# Patient Record
Sex: FEMALE | Race: BLACK OR AFRICAN AMERICAN | Employment: FULL TIME | URBAN - METROPOLITAN AREA
[De-identification: names, ages, dates, MRNs, and addresses within clinical notes are randomized per-mention and may not be internally consistent; named-entity substitution may affect disease eponyms.]

---

## 2017-01-30 ENCOUNTER — HOSPITAL ENCOUNTER (EMERGENCY)
Age: 32
Discharge: HOME | End: 2017-01-30
Attending: EMERGENCY MEDICINE
Payer: MEDICAID

## 2017-01-30 VITALS
WEIGHT: 172.6 LBS | OXYGEN SATURATION: 100 % | SYSTOLIC BLOOD PRESSURE: 126 MMHG | DIASTOLIC BLOOD PRESSURE: 82 MMHG | RESPIRATION RATE: 18 BRPM | BODY MASS INDEX: 27.74 KG/M2 | HEIGHT: 66 IN | HEART RATE: 90 BPM | TEMPERATURE: 98.5 F

## 2017-01-30 DIAGNOSIS — N39.0 URINARY TRACT INFECTION WITHOUT HEMATURIA, SITE UNSPECIFIED: Primary | ICD-10-CM

## 2017-01-30 LAB
ALBUMIN SERPL BCP-MCNC: 3.8 G/DL (ref 3.5–5)
ALBUMIN/GLOB SERPL: 0.8 {RATIO} (ref 1.1–2.2)
ALP SERPL-CCNC: 90 U/L (ref 45–117)
ALT SERPL-CCNC: 15 U/L (ref 12–78)
ANION GAP BLD CALC-SCNC: 8 MMOL/L (ref 5–15)
APPEARANCE UR: ABNORMAL
AST SERPL W P-5'-P-CCNC: 17 U/L (ref 15–37)
BACTERIA URNS QL MICRO: ABNORMAL /HPF
BASOPHILS # BLD AUTO: 0 K/UL (ref 0–0.1)
BASOPHILS # BLD: 0 % (ref 0–1)
BILIRUB SERPL-MCNC: 0.3 MG/DL (ref 0.2–1)
BILIRUB UR QL: NEGATIVE
BUN SERPL-MCNC: 9 MG/DL (ref 6–20)
BUN/CREAT SERPL: 12 (ref 12–20)
CALCIUM SERPL-MCNC: 8.8 MG/DL (ref 8.5–10.1)
CHLORIDE SERPL-SCNC: 104 MMOL/L (ref 97–108)
CO2 SERPL-SCNC: 28 MMOL/L (ref 21–32)
COLOR UR: ABNORMAL
CREAT SERPL-MCNC: 0.77 MG/DL (ref 0.55–1.02)
EOSINOPHIL # BLD: 0.1 K/UL (ref 0–0.4)
EOSINOPHIL NFR BLD: 2 % (ref 0–7)
EPITH CASTS URNS QL MICRO: ABNORMAL /LPF
ERYTHROCYTE [DISTWIDTH] IN BLOOD BY AUTOMATED COUNT: 13.5 % (ref 11.5–14.5)
GLOBULIN SER CALC-MCNC: 4.7 G/DL (ref 2–4)
GLUCOSE SERPL-MCNC: 98 MG/DL (ref 65–100)
GLUCOSE UR STRIP.AUTO-MCNC: NEGATIVE MG/DL
HCG UR QL: NEGATIVE
HCT VFR BLD AUTO: 39.6 % (ref 35–47)
HGB BLD-MCNC: 13.2 G/DL (ref 11.5–16)
HGB UR QL STRIP: NEGATIVE
HYALINE CASTS URNS QL MICRO: ABNORMAL /LPF (ref 0–5)
KETONES UR QL STRIP.AUTO: ABNORMAL MG/DL
LEUKOCYTE ESTERASE UR QL STRIP.AUTO: ABNORMAL
LYMPHOCYTES # BLD AUTO: 37 % (ref 12–49)
LYMPHOCYTES # BLD: 2 K/UL (ref 0.8–3.5)
MCH RBC QN AUTO: 30.2 PG (ref 26–34)
MCHC RBC AUTO-ENTMCNC: 33.3 G/DL (ref 30–36.5)
MCV RBC AUTO: 90.6 FL (ref 80–99)
MONOCYTES # BLD: 0.4 K/UL (ref 0–1)
MONOCYTES NFR BLD AUTO: 8 % (ref 5–13)
MUCOUS THREADS URNS QL MICRO: ABNORMAL /LPF
NEUTS SEG # BLD: 2.9 K/UL (ref 1.8–8)
NEUTS SEG NFR BLD AUTO: 53 % (ref 32–75)
NITRITE UR QL STRIP.AUTO: POSITIVE
PH UR STRIP: 6 [PH] (ref 5–8)
PLATELET # BLD AUTO: 236 K/UL (ref 150–400)
POTASSIUM SERPL-SCNC: 3.9 MMOL/L (ref 3.5–5.1)
PROT SERPL-MCNC: 8.5 G/DL (ref 6.4–8.2)
PROT UR STRIP-MCNC: ABNORMAL MG/DL
RBC # BLD AUTO: 4.37 M/UL (ref 3.8–5.2)
RBC #/AREA URNS HPF: ABNORMAL /HPF (ref 0–5)
SODIUM SERPL-SCNC: 140 MMOL/L (ref 136–145)
SP GR UR REFRACTOMETRY: >1.03 (ref 1–1.03)
UA: UC IF INDICATED,UAUC: ABNORMAL
UROBILINOGEN UR QL STRIP.AUTO: 0.2 EU/DL (ref 0.2–1)
WBC # BLD AUTO: 5.5 K/UL (ref 3.6–11)
WBC URNS QL MICRO: ABNORMAL /HPF (ref 0–4)

## 2017-01-30 PROCEDURE — 36415 COLL VENOUS BLD VENIPUNCTURE: CPT | Performed by: EMERGENCY MEDICINE

## 2017-01-30 PROCEDURE — 87186 SC STD MICRODIL/AGAR DIL: CPT | Performed by: EMERGENCY MEDICINE

## 2017-01-30 PROCEDURE — 80053 COMPREHEN METABOLIC PANEL: CPT | Performed by: EMERGENCY MEDICINE

## 2017-01-30 PROCEDURE — 74011250637 HC RX REV CODE- 250/637: Performed by: PHYSICIAN ASSISTANT

## 2017-01-30 PROCEDURE — 87077 CULTURE AEROBIC IDENTIFY: CPT | Performed by: EMERGENCY MEDICINE

## 2017-01-30 PROCEDURE — 87086 URINE CULTURE/COLONY COUNT: CPT | Performed by: EMERGENCY MEDICINE

## 2017-01-30 PROCEDURE — 81001 URINALYSIS AUTO W/SCOPE: CPT | Performed by: EMERGENCY MEDICINE

## 2017-01-30 PROCEDURE — 99283 EMERGENCY DEPT VISIT LOW MDM: CPT

## 2017-01-30 PROCEDURE — 85025 COMPLETE CBC W/AUTO DIFF WBC: CPT | Performed by: EMERGENCY MEDICINE

## 2017-01-30 PROCEDURE — 81025 URINE PREGNANCY TEST: CPT

## 2017-01-30 RX ORDER — BUTALBITAL, ACETAMINOPHEN AND CAFFEINE 300; 40; 50 MG/1; MG/1; MG/1
1 CAPSULE ORAL
Qty: 10 CAP | Refills: 0 | Status: SHIPPED | OUTPATIENT
Start: 2017-01-30 | End: 2017-04-08

## 2017-01-30 RX ORDER — CEPHALEXIN 500 MG/1
500 CAPSULE ORAL 3 TIMES DAILY
Qty: 21 CAP | Refills: 0 | Status: SHIPPED | OUTPATIENT
Start: 2017-01-30 | End: 2017-02-06

## 2017-01-30 RX ORDER — CEPHALEXIN 500 MG/1
500 CAPSULE ORAL
Status: COMPLETED | OUTPATIENT
Start: 2017-01-30 | End: 2017-01-30

## 2017-01-30 RX ADMIN — CEPHALEXIN 500 MG: 500 CAPSULE ORAL at 18:50

## 2017-01-30 NOTE — ED TRIAGE NOTES
Pt arrives with urinary pain, foul smelling urine, headaches & chills for the last couple of days. Was admitted in July 2016 for UTI/kidney infection. Afebrile in triage.

## 2017-01-31 NOTE — ED PROVIDER NOTES
HPI Comments: 81-year-old female history of pyelonephritis presents with 7 days of dysuria urgency and frequency as well as foul smelling urine. No nausea vomiting flank pain or back pain. She has a mild headache as well she says last time she had these symptoms in association she was admitted for pyelonephritis. She does have history of migraines in the past, she is acting in her symptoms she typically takes fioricet. No neck pain. Patient is a 28 y.o. female presenting with urinary pain and headaches. Urinary Pain    Associated symptoms include urgency. Headache           Past Medical History:   Diagnosis Date    Psychiatric disorder        Past Surgical History:   Procedure Laterality Date    Hx gyn       left ovarian cyst removal         Family History:   Problem Relation Age of Onset    Diabetes Maternal Grandmother     Hypertension Maternal Grandmother        Social History     Social History    Marital status:      Spouse name: N/A    Number of children: N/A    Years of education: N/A     Occupational History    Not on file. Social History Main Topics    Smoking status: Never Smoker    Smokeless tobacco: Not on file    Alcohol use No    Drug use: Not on file    Sexual activity: Yes     Partners: Male     Other Topics Concern    Not on file     Social History Narrative         ALLERGIES: Amoxicillin    Review of Systems   Genitourinary: Positive for dysuria and urgency. Neurological: Positive for headaches. All other systems reviewed and are negative. Vitals:    01/30/17 1715   BP: 126/82   Pulse: 90   Resp: 18   Temp: 98.5 °F (36.9 °C)   SpO2: 100%   Weight: 78.3 kg (172 lb 9.6 oz)   Height: 5' 6\" (1.676 m)            Physical Exam   Constitutional: She is oriented to person, place, and time. She appears well-developed and well-nourished. HENT:   Head: Normocephalic and atraumatic.    Mouth/Throat: Oropharynx is clear and moist.   Eyes: Conjunctivae and EOM are normal. Pupils are equal, round, and reactive to light. Cardiovascular: Normal rate, regular rhythm and normal heart sounds. Pulmonary/Chest: Effort normal and breath sounds normal.   Abdominal: Soft. Normal appearance. There is tenderness in the suprapubic area. Musculoskeletal: Normal range of motion. Neurological: She is alert and oriented to person, place, and time. Skin: Skin is warm and dry. Nursing note and vitals reviewed.        MDM  Number of Diagnoses or Management Options  Urinary tract infection without hematuria, site unspecified:   Diagnosis management comments: UTI - no concern pyelo - keflex    Re ha - responded to fioricet in the past, will provide rx     ED Course       Procedures

## 2017-01-31 NOTE — DISCHARGE INSTRUCTIONS
We hope that we have addressed all of your medical concerns. The examination and treatment you received in the Emergency Department were for an emergent problem and were not intended as complete care. It is important that you follow up with your healthcare provider(s) for ongoing care. If your symptoms worsen or do not improve as expected, and you are unable to reach your usual health care provider(s), you should return to the Emergency Department. Today's healthcare is undergoing tremendous change, and patient satisfaction surveys are one of the many tools to assess the quality of medical care. You may receive a survey from the [employer] organization regarding your experience in the Emergency Department. I hope that your experience has been completely positive, particularly the medical care that I provided. As such, please participate in the survey; anything less than excellent does not meet my expectations or intentions. [facility] [place] [facility] and 8 [facility] participate in nationally recognized quality of care measures. If your blood pressure is greater than 120/80, as reported below, we urge that you seek medical care to address the potential of high blood pressure, commonly known as hypertension. Hypertension can be hereditary or can be caused by certain medical conditions, pain, stress, or \"white coat syndrome. \"       Please make an appointment with your health care provider(s) for follow up of your Emergency Department visit. VITALS:   Patient Vitals for the past 8 hrs:   Temp Pulse Resp BP SpO2   01/30/17 1715 98.5 °F (36.9 °C) 90 18 126/82 100 %          Thank you for allowing us to provide you with medical care today. We realize that you have many choices for your emergency care needs. Please choose us in the future for any continued health care needs. Forest Cardozo  [address] [facility], [address] [place] [address] Pr[place] Rupesh. Office: [phone]            Recent Results (from the past 24 hour(s))   URINALYSIS W/ REFLEX CULTURE    Collection Time: 01/30/17  5:40 PM   Result Value Ref Range    Color YELLOW/STRAW      Appearance CLOUDY (A) CLEAR      Specific gravity >1.030 (H) 1.003 - 1.030    pH (UA) 6.0 5.0 - 8.0      Protein TRACE (A) NEG mg/dL    Glucose NEGATIVE  NEG mg/dL    Ketone TRACE (A) NEG mg/dL    Bilirubin NEGATIVE  NEG      Blood NEGATIVE  NEG      Urobilinogen 0.2 0.2 - 1.0 EU/dL    Nitrites POSITIVE (A) NEG      Leukocyte Esterase SMALL (A) NEG      WBC 20-50 0 - 4 /hpf    RBC 0-5 0 - 5 /hpf    Epithelial cells MANY (A) FEW /lpf    Bacteria 4+ (A) NEG /hpf    UA:UC IF INDICATED URINE CULTURE ORDERED (A) CNI      Mucus 4+ (A) NEG /lpf    Hyaline Cast 2-5 0 - 5 /lpf   CBC WITH AUTOMATED DIFF    Collection Time: 01/30/17  5:40 PM   Result Value Ref Range    WBC 5.5 3.6 - 11.0 K/uL    RBC 4.37 3.80 - 5.20 M/uL    HGB 13.2 11.5 - 16.0 g/dL    HCT 39.6 35.0 - 47.0 %    MCV 90.6 80.0 - 99.0 FL    MCH 30.2 26.0 - 34.0 PG    MCHC 33.3 30.0 - 36.5 g/dL    RDW 13.5 11.5 - 14.5 %    PLATELET 148 635 - 449 K/uL    NEUTROPHILS 53 32 - 75 %    LYMPHOCYTES 37 12 - 49 %    MONOCYTES 8 5 - 13 %    EOSINOPHILS 2 0 - 7 %    BASOPHILS 0 0 - 1 %    ABS. NEUTROPHILS 2.9 1.8 - 8.0 K/UL    ABS. LYMPHOCYTES 2.0 0.8 - 3.5 K/UL    ABS. MONOCYTES 0.4 0.0 - 1.0 K/UL    ABS. EOSINOPHILS 0.1 0.0 - 0.4 K/UL    ABS.  BASOPHILS 0.0 0.0 - 0.1 K/UL   METABOLIC PANEL, COMPREHENSIVE    Collection Time: 01/30/17  5:40 PM   Result Value Ref Range    Sodium 140 136 - 145 mmol/L    Potassium 3.9 3.5 - 5.1 mmol/L    Chloride 104 97 - 108 mmol/L    CO2 28 21 - 32 mmol/L    Anion gap 8 5 - 15 mmol/L    Glucose 98 65 - 100 mg/dL    BUN 9 6 - 20 MG/DL    Creatinine 0.77 0.55 - 1.02 MG/DL    BUN/Creatinine ratio 12 12 - 20      GFR est AA >60 >60 ml/min/1.73m2    GFR est non-AA >60 >60 ml/min/1.73m2    Calcium 8.8 8.5 - 10.1 MG/DL    Bilirubin, total 0.3 0.2 - 1.0 MG/DL    ALT 15 12 - 78 U/L    AST 17 15 - 37 U/L    Alk. phosphatase 90 45 - 117 U/L    Protein, total 8.5 (H) 6.4 - 8.2 g/dL    Albumin 3.8 3.5 - 5.0 g/dL    Globulin 4.7 (H) 2.0 - 4.0 g/dL    A-G Ratio 0.8 (L) 1.1 - 2.2     HCG URINE, QL. - POC    Collection Time: 01/30/17  5:47 PM   Result Value Ref Range    Pregnancy test,urine (POC) NEGATIVE  NEG         No results found. Urinary Tract Infection in Women: Care Instructions  Your Care Instructions    A urinary tract infection, or UTI, is a general term for an infection anywhere between the kidneys and the urethra (where urine comes out). Most UTIs are bladder infections. They often cause pain or burning when you urinate. UTIs are caused by bacteria and can be cured with antibiotics. Be sure to complete your treatment so that the infection goes away. Follow-up care is a key part of your treatment and safety. Be sure to make and go to all appointments, and call your doctor if you are having problems. It's also a good idea to know your test results and keep a list of the medicines you take. How can you care for yourself at home? · Take your antibiotics as directed. Do not stop taking them just because you feel better. You need to take the full course of antibiotics. · Drink extra water and other fluids for the next day or two. This may help wash out the bacteria that are causing the infection. (If you have kidney, heart, or liver disease and have to limit fluids, talk with your doctor before you increase your fluid intake.)  · Avoid drinks that are carbonated or have caffeine. They can irritate the bladder. · Urinate often. Try to empty your bladder each time. · To relieve pain, take a hot bath or lay a heating pad set on low over your lower belly or genital area. Never go to sleep with a heating pad in place. To prevent UTIs  · Drink plenty of water each day. This helps you urinate often, which clears bacteria from your system.  (If you have kidney, heart, or liver disease and have to limit fluids, talk with your doctor before you increase your fluid intake.)  · Consider adding cranberry juice to your diet. · Urinate when you need to. · Urinate right after you have sex. · Change sanitary pads often. · Avoid douches, bubble baths, feminine hygiene sprays, and other feminine hygiene products that have deodorants. · After going to the bathroom, wipe from front to back. When should you call for help? Call your doctor now or seek immediate medical care if:  · Symptoms such as fever, chills, nausea, or vomiting get worse or appear for the first time. · You have new pain in your back just below your rib cage. This is called flank pain. · There is new blood or pus in your urine. · You have any problems with your antibiotic medicine. Watch closely for changes in your health, and be sure to contact your doctor if:  · You are not getting better after taking an antibiotic for 2 days. · Your symptoms go away but then come back. Where can you learn more? Go to http://coleman-kalia.info/. Enter A763 in the search box to learn more about \"Urinary Tract Infection in Women: Care Instructions. \"  Current as of: August 12, 2016  Content Version: 11.1  © 0164-4684 [employer]. Care instructions adapted under license by [employer] (which disclaims liability or warranty for this information). If you have questions about a medical condition or this instruction, always ask your healthcare professional. [place] [address] any warranty or liability for your use of this information.

## 2017-02-01 LAB
BACTERIA SPEC CULT: NORMAL
CC UR VC: NORMAL
SERVICE CMNT-IMP: NORMAL

## 2017-04-08 ENCOUNTER — APPOINTMENT (OUTPATIENT)
Dept: ULTRASOUND IMAGING | Age: 32
End: 2017-04-08
Attending: EMERGENCY MEDICINE
Payer: MEDICAID

## 2017-04-08 ENCOUNTER — HOSPITAL ENCOUNTER (EMERGENCY)
Age: 32
Discharge: HOME | End: 2017-04-09
Attending: EMERGENCY MEDICINE | Admitting: EMERGENCY MEDICINE
Payer: MEDICAID

## 2017-04-08 ENCOUNTER — APPOINTMENT (OUTPATIENT)
Dept: CT IMAGING | Age: 32
End: 2017-04-08
Attending: EMERGENCY MEDICINE
Payer: MEDICAID

## 2017-04-08 DIAGNOSIS — R10.84 ABDOMINAL PAIN, GENERALIZED: Primary | ICD-10-CM

## 2017-04-08 DIAGNOSIS — N76.0 BV (BACTERIAL VAGINOSIS): ICD-10-CM

## 2017-04-08 DIAGNOSIS — B96.89 BV (BACTERIAL VAGINOSIS): ICD-10-CM

## 2017-04-08 DIAGNOSIS — K76.9 LESION OF LIVER: ICD-10-CM

## 2017-04-08 DIAGNOSIS — K59.01 SLOW TRANSIT CONSTIPATION: ICD-10-CM

## 2017-04-08 DIAGNOSIS — N20.0 KIDNEY STONE ON LEFT SIDE: ICD-10-CM

## 2017-04-08 DIAGNOSIS — E86.0 DEHYDRATION: ICD-10-CM

## 2017-04-08 LAB
ALBUMIN SERPL BCP-MCNC: 4 G/DL (ref 3.5–5)
ALBUMIN/GLOB SERPL: 0.9 {RATIO} (ref 1.1–2.2)
ALP SERPL-CCNC: 87 U/L (ref 45–117)
ALT SERPL-CCNC: 16 U/L (ref 12–78)
ANION GAP BLD CALC-SCNC: 10 MMOL/L (ref 5–15)
APPEARANCE UR: ABNORMAL
AST SERPL W P-5'-P-CCNC: 15 U/L (ref 15–37)
BACTERIA URNS QL MICRO: NEGATIVE /HPF
BASOPHILS # BLD AUTO: 0 K/UL (ref 0–0.1)
BASOPHILS # BLD: 0 % (ref 0–1)
BILIRUB SERPL-MCNC: 0.3 MG/DL (ref 0.2–1)
BILIRUB UR QL: NEGATIVE
BUN SERPL-MCNC: 11 MG/DL (ref 6–20)
BUN/CREAT SERPL: 13 (ref 12–20)
CALCIUM SERPL-MCNC: 9.1 MG/DL (ref 8.5–10.1)
CHLORIDE SERPL-SCNC: 104 MMOL/L (ref 97–108)
CLUE CELLS VAG QL WET PREP: NORMAL
CO2 SERPL-SCNC: 27 MMOL/L (ref 21–32)
COLOR UR: ABNORMAL
CREAT SERPL-MCNC: 0.83 MG/DL (ref 0.55–1.02)
EOSINOPHIL # BLD: 0.1 K/UL (ref 0–0.4)
EOSINOPHIL NFR BLD: 1 % (ref 0–7)
EPITH CASTS URNS QL MICRO: ABNORMAL /LPF
ERYTHROCYTE [DISTWIDTH] IN BLOOD BY AUTOMATED COUNT: 13 % (ref 11.5–14.5)
GLOBULIN SER CALC-MCNC: 4.3 G/DL (ref 2–4)
GLUCOSE SERPL-MCNC: 84 MG/DL (ref 65–100)
GLUCOSE UR STRIP.AUTO-MCNC: NEGATIVE MG/DL
HCG UR QL: NEGATIVE
HCT VFR BLD AUTO: 39.9 % (ref 35–47)
HGB BLD-MCNC: 14.1 G/DL (ref 11.5–16)
HGB UR QL STRIP: NEGATIVE
KETONES UR QL STRIP.AUTO: NEGATIVE MG/DL
KOH PREP SPEC: NORMAL
LEUKOCYTE ESTERASE UR QL STRIP.AUTO: ABNORMAL
LIPASE SERPL-CCNC: 119 U/L (ref 73–393)
LYMPHOCYTES # BLD AUTO: 48 % (ref 12–49)
LYMPHOCYTES # BLD: 2.4 K/UL (ref 0.8–3.5)
MCH RBC QN AUTO: 31.6 PG (ref 26–34)
MCHC RBC AUTO-ENTMCNC: 35.3 G/DL (ref 30–36.5)
MCV RBC AUTO: 89.5 FL (ref 80–99)
MONOCYTES # BLD: 0.3 K/UL (ref 0–1)
MONOCYTES NFR BLD AUTO: 7 % (ref 5–13)
MUCOUS THREADS URNS QL MICRO: ABNORMAL /LPF
NEUTS SEG # BLD: 2.2 K/UL (ref 1.8–8)
NEUTS SEG NFR BLD AUTO: 44 % (ref 32–75)
NITRITE UR QL STRIP.AUTO: NEGATIVE
PH UR STRIP: 5.5 [PH] (ref 5–8)
PLATELET # BLD AUTO: 244 K/UL (ref 150–400)
POTASSIUM SERPL-SCNC: 3.7 MMOL/L (ref 3.5–5.1)
PROT SERPL-MCNC: 8.3 G/DL (ref 6.4–8.2)
PROT UR STRIP-MCNC: ABNORMAL MG/DL
RBC # BLD AUTO: 4.46 M/UL (ref 3.8–5.2)
RBC #/AREA URNS HPF: ABNORMAL /HPF (ref 0–5)
SERVICE CMNT-IMP: NORMAL
SODIUM SERPL-SCNC: 141 MMOL/L (ref 136–145)
SP GR UR REFRACTOMETRY: >1.03 (ref 1–1.03)
T VAGINALIS VAG QL WET PREP: NORMAL
UA: UC IF INDICATED,UAUC: ABNORMAL
UROBILINOGEN UR QL STRIP.AUTO: 1 EU/DL (ref 0.2–1)
WBC # BLD AUTO: 5.1 K/UL (ref 3.6–11)
WBC URNS QL MICRO: ABNORMAL /HPF (ref 0–4)

## 2017-04-08 PROCEDURE — 36415 COLL VENOUS BLD VENIPUNCTURE: CPT | Performed by: EMERGENCY MEDICINE

## 2017-04-08 PROCEDURE — 83690 ASSAY OF LIPASE: CPT | Performed by: EMERGENCY MEDICINE

## 2017-04-08 PROCEDURE — 87491 CHLMYD TRACH DNA AMP PROBE: CPT | Performed by: EMERGENCY MEDICINE

## 2017-04-08 PROCEDURE — 81001 URINALYSIS AUTO W/SCOPE: CPT | Performed by: EMERGENCY MEDICINE

## 2017-04-08 PROCEDURE — 85025 COMPLETE CBC W/AUTO DIFF WBC: CPT | Performed by: EMERGENCY MEDICINE

## 2017-04-08 PROCEDURE — 99284 EMERGENCY DEPT VISIT MOD MDM: CPT

## 2017-04-08 PROCEDURE — 74177 CT ABD & PELVIS W/CONTRAST: CPT

## 2017-04-08 PROCEDURE — 80053 COMPREHEN METABOLIC PANEL: CPT | Performed by: EMERGENCY MEDICINE

## 2017-04-08 PROCEDURE — 74011636320 HC RX REV CODE- 636/320: Performed by: EMERGENCY MEDICINE

## 2017-04-08 PROCEDURE — 81025 URINE PREGNANCY TEST: CPT | Performed by: EMERGENCY MEDICINE

## 2017-04-08 PROCEDURE — 74011250636 HC RX REV CODE- 250/636: Performed by: EMERGENCY MEDICINE

## 2017-04-08 PROCEDURE — 96374 THER/PROPH/DIAG INJ IV PUSH: CPT

## 2017-04-08 PROCEDURE — 76856 US EXAM PELVIC COMPLETE: CPT

## 2017-04-08 PROCEDURE — 96361 HYDRATE IV INFUSION ADD-ON: CPT

## 2017-04-08 PROCEDURE — 87210 SMEAR WET MOUNT SALINE/INK: CPT | Performed by: EMERGENCY MEDICINE

## 2017-04-08 PROCEDURE — 76830 TRANSVAGINAL US NON-OB: CPT

## 2017-04-08 RX ORDER — ONDANSETRON 4 MG/1
4 TABLET, FILM COATED ORAL
Qty: 20 TAB | Refills: 0 | Status: SHIPPED | OUTPATIENT
Start: 2017-04-08 | End: 2021-05-18 | Stop reason: CLARIF

## 2017-04-08 RX ORDER — HYDROCODONE BITARTRATE AND ACETAMINOPHEN 5; 325 MG/1; MG/1
1 TABLET ORAL
Qty: 20 TAB | Refills: 0 | Status: SHIPPED | OUTPATIENT
Start: 2017-04-08 | End: 2017-07-27

## 2017-04-08 RX ORDER — SODIUM CHLORIDE 0.9 % (FLUSH) 0.9 %
10 SYRINGE (ML) INJECTION
Status: COMPLETED | OUTPATIENT
Start: 2017-04-08 | End: 2017-04-09

## 2017-04-08 RX ORDER — KETOROLAC TROMETHAMINE 30 MG/ML
30 INJECTION, SOLUTION INTRAMUSCULAR; INTRAVENOUS
Status: COMPLETED | OUTPATIENT
Start: 2017-04-08 | End: 2017-04-08

## 2017-04-08 RX ORDER — METRONIDAZOLE 500 MG/1
500 TABLET ORAL 2 TIMES DAILY
Qty: 14 TAB | Refills: 0 | Status: SHIPPED | OUTPATIENT
Start: 2017-04-08 | End: 2017-04-15

## 2017-04-08 RX ORDER — ONDANSETRON 2 MG/ML
4 INJECTION INTRAMUSCULAR; INTRAVENOUS
Status: DISCONTINUED | OUTPATIENT
Start: 2017-04-08 | End: 2017-04-08

## 2017-04-08 RX ORDER — SODIUM CHLORIDE 9 MG/ML
50 INJECTION, SOLUTION INTRAVENOUS
Status: COMPLETED | OUTPATIENT
Start: 2017-04-08 | End: 2017-04-09

## 2017-04-08 RX ORDER — MORPHINE SULFATE 4 MG/ML
4 INJECTION, SOLUTION INTRAMUSCULAR; INTRAVENOUS
Status: DISCONTINUED | OUTPATIENT
Start: 2017-04-08 | End: 2017-04-08

## 2017-04-08 RX ADMIN — KETOROLAC TROMETHAMINE 30 MG: 30 INJECTION, SOLUTION INTRAMUSCULAR at 23:27

## 2017-04-08 RX ADMIN — SODIUM CHLORIDE 1000 ML: 900 INJECTION, SOLUTION INTRAVENOUS at 22:34

## 2017-04-08 RX ADMIN — SODIUM CHLORIDE 1000 ML: 900 INJECTION, SOLUTION INTRAVENOUS at 20:55

## 2017-04-08 RX ADMIN — DIATRIZOATE MEGLUMINE AND DIATRIZOATE SODIUM 30 ML: 660; 100 LIQUID ORAL; RECTAL at 23:27

## 2017-04-09 VITALS
RESPIRATION RATE: 16 BRPM | SYSTOLIC BLOOD PRESSURE: 139 MMHG | WEIGHT: 167.99 LBS | DIASTOLIC BLOOD PRESSURE: 66 MMHG | HEART RATE: 80 BPM | BODY MASS INDEX: 27.11 KG/M2 | TEMPERATURE: 98.5 F | OXYGEN SATURATION: 99 %

## 2017-04-09 PROCEDURE — 74011636320 HC RX REV CODE- 636/320: Performed by: EMERGENCY MEDICINE

## 2017-04-09 PROCEDURE — 74011250636 HC RX REV CODE- 250/636: Performed by: EMERGENCY MEDICINE

## 2017-04-09 RX ADMIN — IOPAMIDOL 100 ML: 755 INJECTION, SOLUTION INTRAVENOUS at 00:48

## 2017-04-09 RX ADMIN — Medication 10 ML: at 00:48

## 2017-04-09 RX ADMIN — SODIUM CHLORIDE 50 ML/HR: 900 INJECTION, SOLUTION INTRAVENOUS at 00:48

## 2017-04-09 NOTE — ED PROVIDER NOTES
HPI Comments: Stephani Redding is a 28 y.o. female with significant PMHx of ovarian cysts, presents ambulatory to the ED with c/o acute onset of sharp lower abdominal pain x last night. She states she took tylenol with relief last night, however the pain returned. She notes that today a sharp pain \"hit me and I hit the floor\" and the pain felt like \"contractions\". Pt reports she had her menstrual cycle for only 2 days last month, which is not normal for her. Pt notes a family history of diabetes and cardiac problems. Pt denies nausea, vomiting, fever, chills, dysuria, diarrhea, constipation, vaginal discharge, chest pain, SOB, tobacco use, or alcohol use. PCP: Anna Forrester MD    There are no other complaints, changes or physical findings at this time. Written by NATE Luo, as dictated by Bindu Go MD      The history is provided by the patient. No  was used. Past Medical History:   Diagnosis Date    Psychiatric disorder        Past Surgical History:   Procedure Laterality Date    HX GYN      left ovarian cyst removal         Family History:   Problem Relation Age of Onset    Diabetes Maternal Grandmother     Hypertension Maternal Grandmother        Social History     Social History    Marital status:      Spouse name: N/A    Number of children: N/A    Years of education: N/A     Occupational History    Not on file. Social History Main Topics    Smoking status: Never Smoker    Smokeless tobacco: Not on file    Alcohol use No    Drug use: No    Sexual activity: Yes     Partners: Male     Other Topics Concern    Not on file     Social History Narrative         ALLERGIES: Amoxicillin    Review of Systems   Constitutional: Negative for chills and fever. HENT: Negative for congestion. Eyes: Negative. Respiratory: Negative for shortness of breath. Cardiovascular: Negative for chest pain.    Gastrointestinal: Positive for abdominal pain (lower). Negative for constipation, diarrhea, nausea and vomiting. Endocrine: Negative for heat intolerance. Genitourinary: Negative for dysuria and vaginal discharge. Musculoskeletal: Negative for back pain. Skin: Negative for rash. Allergic/Immunologic: Negative for immunocompromised state. Neurological: Negative for dizziness. Hematological: Does not bruise/bleed easily. Psychiatric/Behavioral: Negative. All other systems reviewed and are negative. Vitals:    04/08/17 1955 04/08/17 2030   BP: 125/76 112/68   Pulse: 80    Resp: 16    Temp: 98.5 °F (36.9 °C)    SpO2: 98% 100%   Weight: 76.2 kg (167 lb 15.9 oz)             Physical Exam   Constitutional: She is oriented to person, place, and time. She appears well-developed and well-nourished. No distress. HENT:   Head: Normocephalic and atraumatic. Eyes: EOM are normal.   Neck: Normal range of motion. Neck supple. Cardiovascular: Normal rate, regular rhythm and normal heart sounds. Pulmonary/Chest: Effort normal and breath sounds normal. No respiratory distress. She has no wheezes. Abdominal: Soft. Bowel sounds are normal. She exhibits no mass. There is tenderness (diffuse). Genitourinary: Vagina normal. There is no rash, tenderness or lesion on the right labia. There is no rash, tenderness or lesion on the left labia. Uterus is tender. Cervix exhibits no motion tenderness and no discharge. Right adnexum displays no mass, no tenderness and no fullness. Left adnexum displays no mass, no tenderness and no fullness. No bleeding in the vagina. No vaginal discharge found. Musculoskeletal: Normal range of motion. She exhibits no edema. Lymphadenopathy:        Right: No inguinal adenopathy present. Left: No inguinal adenopathy present. Neurological: She is alert and oriented to person, place, and time. Coordination normal.   Skin: Skin is warm and dry. Psychiatric: She has a normal mood and affect. Nursing note and vitals reviewed. MDM  Number of Diagnoses or Management Options  Abdominal pain, generalized:   BV (bacterial vaginosis):   Dehydration:   Kidney stone on left side:   Lesion of liver:   Slow transit constipation:   Diagnosis management comments:       DDx: ovarian cyst, kidney stone, UTI, PID, torsion, pancreatitis        Amount and/or Complexity of Data Reviewed  Clinical lab tests: ordered and reviewed  Tests in the radiology section of CPT®: ordered and reviewed  Tests in the medicine section of CPT®: reviewed and ordered    Patient Progress  Patient progress: stable    ED Course       Procedures    Procedure Note - Pelvic Exam:    9:21 PM  Performed by: Darek Wells   Chaperoned by: Hoda Maurice RN  Pelvic exam was performed using bimanual and speculum. Further findings noted in physical exam.   The procedure took 1-15 minutes, and pt tolerated well. Written by Isa Pineda, NATE Scribe, as dictated by Betsy Ron MD.    Progress Note:  11:11 PM  Pt report she is not feeling better, however does not want strong pain medication because she drove herself to the ER. Ultrasound was negative and will CT pt. Written by Isa Pineda ED Scribe, as dictated by Betsy Ron MD.    SIGN OUT:  11:22 PM  Patient's presentation, labs/imaging and plan of care was reviewed with Quyen Escamilla M.D. as part of sign out. They will wait for CT results and discharge if results are negative as part of the plan discussed with the patient. Quyen Escamilla M.D.'s assistance in completion of this plan is greatly appreciated but it should be noted that I will be the provider of record for this patient. Betsy Ron MD      This note is prepared by Isa Pineda, acting as Scribe for Betsy Ron MD.    Betsy Ron MD : The scribe's documentation has been prepared under my direction and personally reviewed by me in its entirety.  I confirm that the note above accurately reflects all work, treatment, procedures, and medical decision making performed by me. Progress Note:  2:05 AM  Discussed CT results with pt and advised that she will need to F/U for outpatient MRI. Written by Lucian Saldivar ED Scribe as dictated by Walker Gregory M.D    DISCHARGE NOTE:  LABORATORY TESTS:  Recent Results (from the past 12 hour(s))   URINALYSIS W/ REFLEX CULTURE    Collection Time: 04/08/17  8:00 PM   Result Value Ref Range    Color YELLOW/STRAW      Appearance CLOUDY (A) CLEAR      Specific gravity >1.030 (H) 1.003 - 1.030    pH (UA) 5.5 5.0 - 8.0      Protein TRACE (A) NEG mg/dL    Glucose NEGATIVE  NEG mg/dL    Ketone NEGATIVE  NEG mg/dL    Bilirubin NEGATIVE  NEG      Blood NEGATIVE  NEG      Urobilinogen 1.0 0.2 - 1.0 EU/dL    Nitrites NEGATIVE  NEG      Leukocyte Esterase TRACE (A) NEG      WBC 0-4 0 - 4 /hpf    RBC 0-5 0 - 5 /hpf    Epithelial cells MANY (A) FEW /lpf    Bacteria NEGATIVE  NEG /hpf    UA:UC IF INDICATED CULTURE NOT INDICATED BY UA RESULT CNI      Mucus 1+ (A) NEG /lpf   HCG URINE, QL    Collection Time: 04/08/17  8:00 PM   Result Value Ref Range    HCG urine, Ql. NEGATIVE  NEG     CBC WITH AUTOMATED DIFF    Collection Time: 04/08/17  8:33 PM   Result Value Ref Range    WBC 5.1 3.6 - 11.0 K/uL    RBC 4.46 3.80 - 5.20 M/uL    HGB 14.1 11.5 - 16.0 g/dL    HCT 39.9 35.0 - 47.0 %    MCV 89.5 80.0 - 99.0 FL    MCH 31.6 26.0 - 34.0 PG    MCHC 35.3 30.0 - 36.5 g/dL    RDW 13.0 11.5 - 14.5 %    PLATELET 616 672 - 016 K/uL    NEUTROPHILS 44 32 - 75 %    LYMPHOCYTES 48 12 - 49 %    MONOCYTES 7 5 - 13 %    EOSINOPHILS 1 0 - 7 %    BASOPHILS 0 0 - 1 %    ABS. NEUTROPHILS 2.2 1.8 - 8.0 K/UL    ABS. LYMPHOCYTES 2.4 0.8 - 3.5 K/UL    ABS. MONOCYTES 0.3 0.0 - 1.0 K/UL    ABS. EOSINOPHILS 0.1 0.0 - 0.4 K/UL    ABS.  BASOPHILS 0.0 0.0 - 0.1 K/UL   METABOLIC PANEL, COMPREHENSIVE    Collection Time: 04/08/17  8:33 PM   Result Value Ref Range    Sodium 141 136 - 145 mmol/L    Potassium 3.7 3.5 - 5.1 mmol/L    Chloride 104 97 - 108 mmol/L    CO2 27 21 - 32 mmol/L    Anion gap 10 5 - 15 mmol/L    Glucose 84 65 - 100 mg/dL    BUN 11 6 - 20 MG/DL    Creatinine 0.83 0.55 - 1.02 MG/DL    BUN/Creatinine ratio 13 12 - 20      GFR est AA >60 >60 ml/min/1.73m2    GFR est non-AA >60 >60 ml/min/1.73m2    Calcium 9.1 8.5 - 10.1 MG/DL    Bilirubin, total 0.3 0.2 - 1.0 MG/DL    ALT (SGPT) 16 12 - 78 U/L    AST (SGOT) 15 15 - 37 U/L    Alk. phosphatase 87 45 - 117 U/L    Protein, total 8.3 (H) 6.4 - 8.2 g/dL    Albumin 4.0 3.5 - 5.0 g/dL    Globulin 4.3 (H) 2.0 - 4.0 g/dL    A-G Ratio 0.9 (L) 1.1 - 2.2     LIPASE    Collection Time: 04/08/17  8:33 PM   Result Value Ref Range    Lipase 119 73 - 393 U/L   WET PREP    Collection Time: 04/08/17  9:26 PM   Result Value Ref Range    Clue cells CLUE CELLS PRESENT      Wet prep NO TRICHOMONAS SEEN     KOH, OTHER SOURCES    Collection Time: 04/08/17  9:26 PM   Result Value Ref Range    Special Requests: NO SPECIAL REQUESTS      KOH NO YEAST SEEN         IMAGING RESULTS:  CT ABD PELV W CONT   Final ResultINDICATION: Middle lower abdominal pain. Normal white blood cell count. Normal  liver enzymes and lipase.     COMPARISON: Ultrasound female pelvis on 4/8/2017.     TECHNIQUE:   Following the uneventful intravenous administration of 100 cc Isovue-370, thin  axial images were obtained through the abdomen and pelvis. Coronal and sagittal  reconstructions were generated. Oral contrast was administered. CT dose  reduction was achieved through use of a standardized protocol tailored for this  examination and automatic exposure control for dose modulation.     FINDINGS:   LUNG BASES: Clear. INCIDENTALLY IMAGED HEART AND MEDIASTINUM: Unremarkable. LIVER: There are numerous enhancing liver lesions, similar to blood pool. Liver  surface is smooth. GALLBLADDER: Unremarkable. SPLEEN: No mass. PANCREAS: No mass or ductal dilatation. ADRENALS: Unremarkable.   KIDNEYS: 1 mm calculus is in the upper pole of the left kidney. No solid renal  mass or hydronephrosis. STOMACH: Distended with enteric contrast.  SMALL BOWEL: No dilatation or wall thickening. COLON: Mild colonic fecal stasis. APPENDIX: Unremarkable. PERITONEUM: No ascites or pneumoperitoneum. RETROPERITONEUM: No lymphadenopathy or aortic aneurysm. REPRODUCTIVE ORGANS: Uterus and ovaries are within normal limits. Free pelvic  fluid is physiologic. URINARY BLADDER: No mass or calculus. BONES: No destructive bone lesion. ADDITIONAL COMMENTS: N/A     IMPRESSION  IMPRESSION:     1. Normal appendix. 2. Mild colonic fecal stasis. 3. Nonobstructing left nephrolithiasis. 4. Nonspecific liver lesions likely represent hemangiomas or adenomas. Recommend  nonemergent dedicated hepatic MRI.         US PELV NON OBS   Final ResultEXAM: US PELV NON OBS, US TRANSVAGINAL     INDICATION: Right ovarian cyst in June, 2016. Midline pelvic pain for 3 days.     COMPARISON: None.     TECHNIQUE: Limited transabdominal and complete endovaginal ultrasound of the  female pelvis. (2 separate studies reported together) Transvaginal scanning was  performed for better evaluation of all structures.     FINDINGS: Transabdominal ultrasound:     Urinary bladder: Nondistended. Uterus and ovaries are not visible. No evidence  of adnexal mass.     Endovaginal ultrasound:      Urinary bladder: Nondistended.     Uterus: measures 8.8 x 5.1 x 5.0 cm, which is within normal limits. There is no  sonographic myometrial abnormality.     Endometrium: 14 mm in thickness. Homogeneous.     Right ovary: 3.4 x 3.2 x 1.9 cm. Blood flow is present in the right ovary. No  right adnexal mass or cyst.     Left ovary: Obscured by bowel gas. No adnexal cyst or mass.     Free fluid: Physiologic.     IMPRESSION  IMPRESSION:   Normal sonographic appearance of the female pelvis. Normal right ovary. No  visible left ovary.       US TRANSVAGINAL   Final ResultEXAM: US PELV NON OBS, US TRANSVAGINAL     INDICATION: Right ovarian cyst in June, 2016. Midline pelvic pain for 3 days.     COMPARISON: None.     TECHNIQUE: Limited transabdominal and complete endovaginal ultrasound of the  female pelvis. (2 separate studies reported together) Transvaginal scanning was  performed for better evaluation of all structures.     FINDINGS: Transabdominal ultrasound:     Urinary bladder: Nondistended. Uterus and ovaries are not visible. No evidence  of adnexal mass.     Endovaginal ultrasound:      Urinary bladder: Nondistended.     Uterus: measures 8.8 x 5.1 x 5.0 cm, which is within normal limits. There is no  sonographic myometrial abnormality.     Endometrium: 14 mm in thickness. Homogeneous.     Right ovary: 3.4 x 3.2 x 1.9 cm. Blood flow is present in the right ovary. No  right adnexal mass or cyst.     Left ovary: Obscured by bowel gas. No adnexal cyst or mass.     Free fluid: Physiologic.     IMPRESSION  IMPRESSION:   Normal sonographic appearance of the female pelvis. Normal right ovary. No  visible left ovary. MEDICATIONS GIVEN:  Medications   sodium chloride 0.9 % bolus infusion 1,000 mL (0 mL IntraVENous IV Completed 4/8/17 2234)   sodium chloride 0.9 % bolus infusion 1,000 mL (1,000 mL IntraVENous New Bag 4/8/17 2234)   ketorolac (TORADOL) injection 30 mg (30 mg IntraVENous Given 4/8/17 2327)   diatrizoate meglumine-d.sodium (MD-GASTROVIEW,GASTROGRAFIN) 66-10 % contrast solution 30 mL (30 mL Oral Given 4/8/17 2327)   0.9% sodium chloride infusion (50 mL/hr IntraVENous New Bag 4/9/17 0048)   sodium chloride (NS) flush 10 mL (10 mL IntraVENous Given 4/9/17 0048)   iopamidol (ISOVUE-370) 76 % injection 100 mL (100 mL IntraVENous Given 4/9/17 0048)       IMPRESSION:  1. Abdominal pain, generalized    2. Dehydration    3. BV (bacterial vaginosis)    4. Slow transit constipation    5. Lesion of liver    6.  Kidney stone on left side        PLAN:  Current Discharge Medication List      START taking these medications    Details   ondansetron hcl (ZOFRAN, AS HYDROCHLORIDE,) 4 mg tablet Take 1 Tab by mouth every eight (8) hours as needed for Nausea. Qty: 20 Tab, Refills: 0      HYDROcodone-acetaminophen (NORCO) 5-325 mg per tablet Take 1 Tab by mouth every four (4) hours as needed for Pain. Max Daily Amount: 6 Tabs. Qty: 20 Tab, Refills: 0      metroNIDAZOLE (FLAGYL) 500 mg tablet Take 1 Tab by mouth two (2) times a day for 7 days. Qty: 14 Tab, Refills: 0         STOP taking these medications       butalbital-acetaminophen-caff (FIORICET) -40 mg per capsule Comments:   Reason for Stopping:         oxyCODONE IR (ROXICODONE) 5 mg immediate release tablet Comments:   Reason for Stopping:         levofloxacin (LEVAQUIN) 500 mg tablet Comments:   Reason for Stopping: Follow-up Information     Follow up With Details Comments Contact Johanna Laws MD In 2 days As needed [address] [facility]  [address] [facility] [address]      Mary Ann Laureano MD  As needed [address]  [address] [address] [phone]      [facility] EMERGENCY DEPT  If symptoms worsen [address] [facility]  [address] [facility]  [phone]        Return to ED if worse       Discharge Note:  2:05 AM  The patient has been re-evaluated and is ready for discharge. Reviewed available results with patient. Counseled patient on diagnosis and care plan. Patient has expressed understanding, and all questions have been answered. Patient agrees with plan and agrees to follow up as recommended, or to return to the ED if their symptoms worsen. Discharge instructions have been provided and explained to the patient, along with reasons to return to the ED. Written by Usha Roberts, ED Scribe, as dictated by Malu Lr M.D. ATTESTATION:  This note is prepared by Usha Roberts, acting as Scribe for Malu Lr M.D. Malu Lr M.D and personally reviewed by me in its entirety.  I confirm that the note above accurately reflects all work, treatment, procedures, and medical decision making performed by me.

## 2017-04-09 NOTE — DISCHARGE INSTRUCTIONS
Please make sure to follow up with your primary care doctor about outpatient MRI to evaluate liver lesions. You were seen for decreased bowel movements and abdominal pain, most likely due to constipation. For the constipation, drink plenty of fluids, use Colace stool softener with Dulcolax or Miralax twice a day laxative until bowel movement occurs. If still no bowel movement, can buy Magnesium Citrate over the counter for a whole bowel cleanse. Maintain a high fiber diet by eating green vegetables and fruit and drink 6-8 large glasses of water daily to avoid constipation in the future. Metamucil, 1 tablespoon once or twice daily can be used to keep bowels regular if needed. Timing elimination to occur after meals, or after a hot drink, can also improve the situation long term. Call if symptoms persist or worsen. Constipation: Care Instructions  Your Care Instructions  Constipation means that you have a hard time passing stools (bowel movements). People pass stools from 3 times a day to once every 3 days. What is normal for you may be different. Constipation may occur with pain in the rectum and cramping. The pain may get worse when you try to pass stools. Sometimes there are small amounts of bright red blood on toilet paper or the surface of stools. This is because of enlarged veins near the rectum (hemorrhoids). A few changes in your diet and lifestyle may help you avoid ongoing constipation. Your doctor may also prescribe medicine to help loosen your stool. Some medicines can cause constipation. These include pain medicines and antidepressants. Tell your doctor about all the medicines you take. Your doctor may want to make a medicine change to ease your symptoms. Follow-up care is a key part of your treatment and safety. Be sure to make and go to all appointments, and call your doctor if you are having problems.  It's also a good idea to know your test results and keep a list of the medicines you take. How can you care for yourself at home? · Drink plenty of fluids, enough so that your urine is light yellow or clear like water. If you have kidney, heart, or liver disease and have to limit fluids, talk with your doctor before you increase the amount of fluids you drink. · Include high-fiber foods in your diet each day. These include fruits, vegetables, beans, and whole grains. · Get at least 30 minutes of exercise on most days of the week. Walking is a good choice. You also may want to do other activities, such as running, swimming, cycling, or playing tennis or team sports. · Take a fiber supplement, such as Citrucel or Metamucil, every day. Read and follow all instructions on the label. · Schedule time each day for a bowel movement. A daily routine may help. Take your time having your bowel movement. · Support your feet with a small step stool when you sit on the toilet. This helps flex your hips and places your pelvis in a squatting position. · Your doctor may recommend an over-the-counter laxative to relieve your constipation. Examples are Milk of Magnesia and MiraLax. Read and follow all instructions on the label. Do not use laxatives on a long-term basis. When should you call for help? Call your doctor now or seek immediate medical care if:  · You have new or worse belly pain. · You have new or worse nausea or vomiting. · You have blood in your stools. Watch closely for changes in your health, and be sure to contact your doctor if:  · Your constipation is getting worse. · You do not get better as expected. Where can you learn more? Go to http://coleman-kalia.info/. Enter 21  in the search box to learn more about \"Constipation: Care Instructions. \"  Current as of: May 27, 2016  Content Version: 11.2  © 0927-7934 [employer]. Care instructions adapted under license by [employer] (which disclaims liability or warranty for this information).  If you have questions about a medical condition or this instruction, always ask your healthcare professional. [address] any warranty or liability for your use of this information. Abdominal Pain: Care Instructions  Your Care Instructions    Abdominal pain has many possible causes. Some aren't serious and get better on their own in a few days. Others need more testing and treatment. If your pain continues or gets worse, you need to be rechecked and may need more tests to find out what is wrong. You may need surgery to correct the problem. Don't ignore new symptoms, such as fever, nausea and vomiting, urination problems, pain that gets worse, and dizziness. These may be signs of a more serious problem. Your doctor may have recommended a follow-up visit in the next 8 to 12 hours. If you are not getting better, you may need more tests or treatment. The doctor has checked you carefully, but problems can develop later. If you notice any problems or new symptoms, get medical treatment right away. Follow-up care is a key part of your treatment and safety. Be sure to make and go to all appointments, and call your doctor if you are having problems. It's also a good idea to know your test results and keep a list of the medicines you take. How can you care for yourself at home? · Rest until you feel better. · To prevent dehydration, drink plenty of fluids, enough so that your urine is light yellow or clear like water. Choose water and other caffeine-free clear liquids until you feel better. If you have kidney, heart, or liver disease and have to limit fluids, talk with your doctor before you increase the amount of fluids you drink. · If your stomach is upset, eat mild foods, such as rice, dry toast or crackers, bananas, and applesauce. Try eating several small meals instead of two or three large ones.   · Wait until 48 hours after all symptoms have gone away before you have spicy foods, alcohol, and drinks that contain caffeine. · Do not eat foods that are high in fat. · Avoid anti-inflammatory medicines such as aspirin, ibuprofen (Advil, Motrin), and naproxen (Aleve). These can cause stomach upset. Talk to your doctor if you take daily aspirin for another health problem. When should you call for help? Call 911 anytime you think you may need emergency care. For example, call if:  · You passed out (lost consciousness). · You pass maroon or very bloody stools. · You vomit blood or what looks like coffee grounds. · You have new, severe belly pain. Call your doctor now or seek immediate medical care if:  · Your pain gets worse, especially if it becomes focused in one area of your belly. · You have a new or higher fever. · Your stools are black and look like tar, or they have streaks of blood. · You have unexpected vaginal bleeding. · You have symptoms of a urinary tract infection. These may include:  ¨ Pain when you urinate. ¨ Urinating more often than usual.  ¨ Blood in your urine. · You are dizzy or lightheaded, or you feel like you may faint. Watch closely for changes in your health, and be sure to contact your doctor if:  · You are not getting better after 1 day (24 hours). Where can you learn more? Go to http://coleman-kalia.info/. Enter S168 in the search box to learn more about \"Abdominal Pain: Care Instructions. \"  Current as of: May 27, 2016  Content Version: 11.2  © 2270-8234 [employer]. Care instructions adapted under license by [employer] (which disclaims liability or warranty for this information). If you have questions about a medical condition or this instruction, always ask your healthcare professional. [place] [address] any warranty or liability for your use of this information. Bacterial Vaginosis: Care Instructions  Your Care Instructions    Bacterial vaginosis is a type of vaginal infection.  It is caused by excess growth of certain bacteria that are normally found in the vagina. Symptoms can include itching, swelling, pain when you urinate or have sex, and a gray or yellow discharge with a \"fishy\" odor. It is not considered an infection that is spread through sexual contact. Although symptoms can be annoying and uncomfortable, bacterial vaginosis does not usually cause other health problems. However, if you have it while you are pregnant, it can cause complications. While the infection may go away on its own, most doctors use antibiotics to treat it. You may have been prescribed pills or vaginal cream. With treatment, bacterial vaginosis usually clears up in 5 to 7 days. Follow-up care is a key part of your treatment and safety. Be sure to make and go to all appointments, and call your doctor if you are having problems. It's also a good idea to know your test results and keep a list of the medicines you take. How can you care for yourself at home? · Take your antibiotics as directed. Do not stop taking them just because you feel better. You need to take the full course of antibiotics. · Do not eat or drink anything that contains alcohol if you are taking metronidazole (Flagyl). · Keep using your medicine if you start your period. Use pads instead of tampons while using a vaginal cream or suppository. Tampons can absorb the medicine. · Wear loose cotton clothing. Do not wear nylon and other materials that hold body heat and moisture close to the skin. · Do not scratch. Relieve itching with a cold pack or a cool bath. · Do not wash your vaginal area more than once a day. Use plain water or a mild, unscented soap. Do not douche. When should you call for help? Watch closely for changes in your health, and be sure to contact your doctor if:  · You have unexpected vaginal bleeding. · You have a fever. · You have new or increased pain in your vagina or pelvis.   · You are not getting better after 1 week.  · Your symptoms return after you finish the course of your medicine. Where can you learn more? Go to http://coleman-kalia.info/. Wanda Sat in the search box to learn more about \"Bacterial Vaginosis: Care Instructions. \"  Current as of: October 13, 2016  Content Version: 11.2  © 4604-8259 [employer]. Care instructions adapted under license by [employer] (which disclaims liability or warranty for this information). If you have questions about a medical condition or this instruction, always ask your healthcare professional. [place] [address] any warranty or liability for your use of this information. Dehydration: Care Instructions  Your Care Instructions  Dehydration happens when your body loses too much fluid. This might happen when you do not drink enough water or you lose large amounts of fluids from your body because of diarrhea, vomiting, or sweating. Severe dehydration can be life-threatening. Water and minerals called electrolytes help put your body fluids back in balance. Learn the early signs of fluid loss, and drink more fluids to prevent dehydration. Follow-up care is a key part of your treatment and safety. Be sure to make and go to all appointments, and call your doctor if you are having problems. It's also a good idea to know your test results and keep a list of the medicines you take. How can you care for yourself at home? · To prevent dehydration, drink plenty of fluids, enough so that your urine is light yellow or clear like water. Choose water and other caffeine-free clear liquids until you feel better. If you have kidney, heart, or liver disease and have to limit fluids, talk with your doctor before you increase the amount of fluids you drink. · If you do not feel like eating or drinking, try taking small sips of water, sports drinks, or other rehydration drinks.   · Get plenty of rest.  To prevent dehydration  · Add more fluids to your diet and daily routine, unless your doctor has told you not to. · During hot weather, drink more fluids. Drink even more fluids if you exercise a lot. Stay away from drinks with alcohol or caffeine. · Watch for the symptoms of dehydration. These include:  ¨ A dry, sticky mouth. ¨ Dark yellow urine, and not much of it. ¨ Dry and sunken eyes. ¨ Feeling very tired. · Learn what problems can lead to dehydration. These include:  ¨ Diarrhea, fever, and vomiting. ¨ Any illness with a fever, such as pneumonia or the flu. ¨ Activities that cause heavy sweating, such as endurance races and heavy outdoor work in hot or humid weather. ¨ Alcohol or drug abuse or withdrawal.  ¨ Certain medicines, such as cold and allergy pills (antihistamines), diet pills (diuretics), and laxatives. ¨ Certain diseases, such as diabetes, cancer, and heart or kidney disease. When should you call for help? Call 911 anytime you think you may need emergency care. For example, call if:  · You passed out (lost consciousness). Call your doctor now or seek immediate medical care if:  · You are confused and cannot think clearly. · You are dizzy or lightheaded, or you feel like you may faint. · You have signs of needing more fluids. You have sunken eyes and a dry mouth, and you pass only a little dark urine. · You cannot keep fluids down. Watch closely for changes in your health, and be sure to contact your doctor if:  · You are not making tears. · Your skin is very dry and sags slowly back into place after you pinch it. · Your mouth and eyes are very dry. Where can you learn more? Go to http://coleman-kalia.info/. Enter Y661 in the search box to learn more about \"Dehydration: Care Instructions. \"  Current as of: May 27, 2016  Content Version: 11.2  © 0085-4915 [employer].  Care instructions adapted under license by "[employer] (which disclaims liability or warranty for this information). If you have questions about a medical condition or this instruction, always ask your healthcare professional. [place] [address] any warranty or liability for your use of this information.

## 2017-04-09 NOTE — ED NOTES
Pt does not want morphine (driving herself) or zofran, (she states she is not nauseous). Dr. Lorraine Bond informed.

## 2017-04-10 LAB
C TRACH DNA SPEC QL NAA+PROBE: NEGATIVE
N GONORRHOEA DNA SPEC QL NAA+PROBE: NEGATIVE
SAMPLE TYPE: NORMAL
SERVICE CMNT-IMP: NORMAL
SPECIMEN SOURCE: NORMAL

## 2017-05-06 ENCOUNTER — HOSPITAL ENCOUNTER (OUTPATIENT)
Dept: MRI IMAGING | Age: 32
Discharge: HOME | End: 2017-05-06
Attending: NURSE PRACTITIONER
Payer: MEDICAID

## 2017-05-06 DIAGNOSIS — K76.9 LESION OF LIVER: ICD-10-CM

## 2017-05-06 PROCEDURE — 77030021566 MRI ABD W MRCP W WO CONT

## 2017-05-06 PROCEDURE — 74011000258 HC RX REV CODE- 258: Performed by: RADIOLOGY

## 2017-05-06 PROCEDURE — A9585 GADOBUTROL INJECTION: HCPCS | Performed by: RADIOLOGY

## 2017-05-06 PROCEDURE — 74011250636 HC RX REV CODE- 250/636: Performed by: RADIOLOGY

## 2017-05-06 PROCEDURE — 74183 MRI ABD W/O CNTR FLWD CNTR: CPT

## 2017-05-06 RX ORDER — SODIUM CHLORIDE 0.9 % (FLUSH) 0.9 %
10 SYRINGE (ML) INJECTION
Status: COMPLETED | OUTPATIENT
Start: 2017-05-06 | End: 2017-05-06

## 2017-05-06 RX ADMIN — GADOBUTROL 7.5 ML: 604.72 INJECTION INTRAVENOUS at 11:00

## 2017-05-06 RX ADMIN — Medication 10 ML: at 11:00

## 2017-05-06 RX ADMIN — SODIUM CHLORIDE 100 ML: 900 INJECTION, SOLUTION INTRAVENOUS at 11:00

## 2017-06-16 LAB
ANTIBODY SCREEN, EXTERNAL: NORMAL
HBSAG, EXTERNAL: NORMAL
HIV, EXTERNAL: NON REACTIVE
RPR, EXTERNAL: NON REACTIVE
RUBELLA, EXTERNAL: NORMAL

## 2017-07-27 ENCOUNTER — APPOINTMENT (OUTPATIENT)
Dept: ULTRASOUND IMAGING | Age: 32
End: 2017-07-27
Attending: EMERGENCY MEDICINE
Payer: MEDICAID

## 2017-07-27 ENCOUNTER — HOSPITAL ENCOUNTER (EMERGENCY)
Age: 32
Discharge: HOME | End: 2017-07-27
Attending: EMERGENCY MEDICINE
Payer: MEDICAID

## 2017-07-27 VITALS
WEIGHT: 171 LBS | BODY MASS INDEX: 27.48 KG/M2 | TEMPERATURE: 98.7 F | HEART RATE: 77 BPM | DIASTOLIC BLOOD PRESSURE: 63 MMHG | RESPIRATION RATE: 16 BRPM | OXYGEN SATURATION: 100 % | HEIGHT: 66 IN | SYSTOLIC BLOOD PRESSURE: 101 MMHG

## 2017-07-27 DIAGNOSIS — N30.00 ACUTE CYSTITIS WITHOUT HEMATURIA: Primary | ICD-10-CM

## 2017-07-27 LAB
ALBUMIN SERPL BCP-MCNC: 3.7 G/DL (ref 3.5–5)
ALBUMIN/GLOB SERPL: 0.8 {RATIO} (ref 1.1–2.2)
ALP SERPL-CCNC: 68 U/L (ref 45–117)
ALT SERPL-CCNC: 14 U/L (ref 12–78)
ANION GAP BLD CALC-SCNC: 7 MMOL/L (ref 5–15)
APPEARANCE UR: ABNORMAL
AST SERPL W P-5'-P-CCNC: 11 U/L (ref 15–37)
BACTERIA URNS QL MICRO: ABNORMAL /HPF
BASOPHILS # BLD AUTO: 0 K/UL (ref 0–0.1)
BASOPHILS # BLD: 0 % (ref 0–1)
BILIRUB SERPL-MCNC: 0.2 MG/DL (ref 0.2–1)
BILIRUB UR QL: NEGATIVE
BUN SERPL-MCNC: 11 MG/DL (ref 6–20)
BUN/CREAT SERPL: 17 (ref 12–20)
CALCIUM SERPL-MCNC: 9 MG/DL (ref 8.5–10.1)
CHLORIDE SERPL-SCNC: 102 MMOL/L (ref 97–108)
CO2 SERPL-SCNC: 26 MMOL/L (ref 21–32)
COLOR UR: ABNORMAL
CREAT SERPL-MCNC: 0.64 MG/DL (ref 0.55–1.02)
EOSINOPHIL # BLD: 0.1 K/UL (ref 0–0.4)
EOSINOPHIL NFR BLD: 1 % (ref 0–7)
EPITH CASTS URNS QL MICRO: ABNORMAL /LPF
ERYTHROCYTE [DISTWIDTH] IN BLOOD BY AUTOMATED COUNT: 13.5 % (ref 11.5–14.5)
GLOBULIN SER CALC-MCNC: 4.5 G/DL (ref 2–4)
GLUCOSE SERPL-MCNC: 85 MG/DL (ref 65–100)
GLUCOSE UR STRIP.AUTO-MCNC: NEGATIVE MG/DL
HCT VFR BLD AUTO: 38.3 % (ref 35–47)
HGB BLD-MCNC: 13.2 G/DL (ref 11.5–16)
HGB UR QL STRIP: NEGATIVE
KETONES UR QL STRIP.AUTO: NEGATIVE MG/DL
LEUKOCYTE ESTERASE UR QL STRIP.AUTO: ABNORMAL
LYMPHOCYTES # BLD AUTO: 30 % (ref 12–49)
LYMPHOCYTES # BLD: 2.3 K/UL (ref 0.8–3.5)
MCH RBC QN AUTO: 30.8 PG (ref 26–34)
MCHC RBC AUTO-ENTMCNC: 34.5 G/DL (ref 30–36.5)
MCV RBC AUTO: 89.5 FL (ref 80–99)
MONOCYTES # BLD: 0.6 K/UL (ref 0–1)
MONOCYTES NFR BLD AUTO: 7 % (ref 5–13)
NEUTS SEG # BLD: 4.8 K/UL (ref 1.8–8)
NEUTS SEG NFR BLD AUTO: 62 % (ref 32–75)
NITRITE UR QL STRIP.AUTO: NEGATIVE
PH UR STRIP: 6 [PH] (ref 5–8)
PLATELET # BLD AUTO: 216 K/UL (ref 150–400)
POTASSIUM SERPL-SCNC: 3.3 MMOL/L (ref 3.5–5.1)
PROT SERPL-MCNC: 8.2 G/DL (ref 6.4–8.2)
PROT UR STRIP-MCNC: NEGATIVE MG/DL
RBC # BLD AUTO: 4.28 M/UL (ref 3.8–5.2)
RBC #/AREA URNS HPF: ABNORMAL /HPF (ref 0–5)
SODIUM SERPL-SCNC: 135 MMOL/L (ref 136–145)
SP GR UR REFRACTOMETRY: 1.03 (ref 1–1.03)
UROBILINOGEN UR QL STRIP.AUTO: 0.2 EU/DL (ref 0.2–1)
WBC # BLD AUTO: 7.7 K/UL (ref 3.6–11)
WBC URNS QL MICRO: ABNORMAL /HPF (ref 0–4)

## 2017-07-27 PROCEDURE — 99283 EMERGENCY DEPT VISIT LOW MDM: CPT

## 2017-07-27 PROCEDURE — 80053 COMPREHEN METABOLIC PANEL: CPT | Performed by: EMERGENCY MEDICINE

## 2017-07-27 PROCEDURE — 81001 URINALYSIS AUTO W/SCOPE: CPT | Performed by: EMERGENCY MEDICINE

## 2017-07-27 PROCEDURE — 76770 US EXAM ABDO BACK WALL COMP: CPT

## 2017-07-27 PROCEDURE — 87086 URINE CULTURE/COLONY COUNT: CPT | Performed by: EMERGENCY MEDICINE

## 2017-07-27 PROCEDURE — 85025 COMPLETE CBC W/AUTO DIFF WBC: CPT | Performed by: EMERGENCY MEDICINE

## 2017-07-27 PROCEDURE — 36415 COLL VENOUS BLD VENIPUNCTURE: CPT | Performed by: EMERGENCY MEDICINE

## 2017-07-27 RX ORDER — CEPHALEXIN 500 MG/1
500 CAPSULE ORAL 3 TIMES DAILY
Qty: 21 CAP | Refills: 0 | Status: SHIPPED | OUTPATIENT
Start: 2017-07-27 | End: 2017-08-03

## 2017-07-28 NOTE — DISCHARGE INSTRUCTIONS
We hope that we have addressed all of your medical concerns. The examination and treatment you received in the Emergency Department were for an emergent problem and were not intended as complete care. It is important that you follow up with your healthcare provider(s) for ongoing care. If your symptoms worsen or do not improve as expected, and you are unable to reach your usual health care provider(s), you should return to the Emergency Department. Today's healthcare is undergoing tremendous change, and patient satisfaction surveys are one of the many tools to assess the quality of medical care. You may receive a survey from the [employer] regarding your experience in the Emergency Department. I hope that your experience has been completely positive, particularly the medical care that I provided. As such, please participate in the survey; anything less than excellent does not meet my expectations or intentions. [facility] and [employer] [facility] participate in nationally recognized quality of care measures. If your blood pressure is greater than 120/80, as reported below, we urge that you seek medical care to address the potential of high blood pressure, commonly known as hypertension. Hypertension can be hereditary or can be caused by certain medical conditions, pain, stress, or \"white coat syndrome. \"       Please make an appointment with your health care provider(s) for follow up of your Emergency Department visit. VITALS:   Patient Vitals for the past 8 hrs:   Temp Pulse Resp BP SpO2   07/27/17 1903 99.1 °F (37.3 °C) 81 17 132/75 100 %          Thank you for allowing us to provide you with medical care today. We realize that you have many choices for your emergency care needs. Please choose us in the future for any continued health care needs. Mich Gomez, [address] [place] [address].   Office: [phone]            Recent Results (from the past 24 hour(s))   URINALYSIS W/MICROSCOPIC    Collection Time: 07/27/17  8:21 PM   Result Value Ref Range    Color YELLOW/STRAW      Appearance CLOUDY (A) CLEAR      Specific gravity 1.028 1.003 - 1.030      pH (UA) 6.0 5.0 - 8.0      Protein NEGATIVE  NEG mg/dL    Glucose NEGATIVE  NEG mg/dL    Ketone NEGATIVE  NEG mg/dL    Bilirubin NEGATIVE  NEG      Blood NEGATIVE  NEG      Urobilinogen 0.2 0.2 - 1.0 EU/dL    Nitrites NEGATIVE  NEG      Leukocyte Esterase SMALL (A) NEG      WBC 10-20 0 - 4 /hpf    RBC 0-5 0 - 5 /hpf    Epithelial cells MANY (A) FEW /lpf    Bacteria 1+ (A) NEG /hpf   CBC WITH AUTOMATED DIFF    Collection Time: 07/27/17  8:26 PM   Result Value Ref Range    WBC 7.7 3.6 - 11.0 K/uL    RBC 4.28 3.80 - 5.20 M/uL    HGB 13.2 11.5 - 16.0 g/dL    HCT 38.3 35.0 - 47.0 %    MCV 89.5 80.0 - 99.0 FL    MCH 30.8 26.0 - 34.0 PG    MCHC 34.5 30.0 - 36.5 g/dL    RDW 13.5 11.5 - 14.5 %    PLATELET 894 775 - 606 K/uL    NEUTROPHILS 62 32 - 75 %    LYMPHOCYTES 30 12 - 49 %    MONOCYTES 7 5 - 13 %    EOSINOPHILS 1 0 - 7 %    BASOPHILS 0 0 - 1 %    ABS. NEUTROPHILS 4.8 1.8 - 8.0 K/UL    ABS. LYMPHOCYTES 2.3 0.8 - 3.5 K/UL    ABS. MONOCYTES 0.6 0.0 - 1.0 K/UL    ABS. EOSINOPHILS 0.1 0.0 - 0.4 K/UL    ABS. BASOPHILS 0.0 0.0 - 0.1 K/UL   METABOLIC PANEL, COMPREHENSIVE    Collection Time: 07/27/17  8:26 PM   Result Value Ref Range    Sodium 135 (L) 136 - 145 mmol/L    Potassium 3.3 (L) 3.5 - 5.1 mmol/L    Chloride 102 97 - 108 mmol/L    CO2 26 21 - 32 mmol/L    Anion gap 7 5 - 15 mmol/L    Glucose 85 65 - 100 mg/dL    BUN 11 6 - 20 MG/DL    Creatinine 0.64 0.55 - 1.02 MG/DL    BUN/Creatinine ratio 17 12 - 20      GFR est AA >60 >60 ml/min/1.73m2    GFR est non-AA >60 >60 ml/min/1.73m2    Calcium 9.0 8.5 - 10.1 MG/DL    Bilirubin, total 0.2 0.2 - 1.0 MG/DL    ALT (SGPT) 14 12 - 78 U/L    AST (SGOT) 11 (L) 15 - 37 U/L    Alk.  phosphatase 68 45 - 117 U/L    Protein, total 8.2 6.4 - 8.2 g/dL    Albumin 3.7 3.5 - 5.0 g/dL    Globulin 4.5 (H) 2.0 - 4.0 g/dL    A-G Ratio 0.8 (L) 1.1 - 2.2         Us Retroperitoneum Comp    Result Date: 2017  Medical indication: Left flank pain dysuria. Real-time ultrasonography of the kidneys obtained. Both kidneys are normal in size and echogenicity without masses or hydronephrosis. Visualized portion of aorta iliac and IVC appear unremarkable. The bladder is incompletely distended. impression: Negative ultrasound the kidneys. Urinary Tract Infection in Pregnancy: Care Instructions  Your Care Instructions    A urinary tract infection, or UTI, is an infection of the bladder and other urinary structures. Most UTIs occur in the bladder. They often cause pain or burning when you urinate. UTI is the most common bacterial infection in pregnancy. If untreated, a UTI could lead to problems such as a kidney infection or  labor. Most UTIs can be cured with antibiotics. Your doctor will prescribe an antibiotic that is safe during pregnancy. Be sure to finish your medicine so that the infection does not spread to your kidneys. Follow-up care is a key part of your treatment and safety. Be sure to make and go to all appointments, and call your doctor if you are having problems. It's also a good idea to know your test results and keep a list of the medicines you take. How can you care for yourself at home? · Take your antibiotics as directed. Do not stop taking them just because you feel better. You need to take the full course of antibiotics. · Drink extra water and other fluids for the next day or two. This will help wash out the bacteria causing the infection. If you have kidney, heart, or liver disease and have to limit fluids, talk with your doctor before you increase the amount of fluids you drink. · Do not drink alcohol. · Urinate often. Try to empty your bladder each time.   Preventing UTIs  · Drink plenty of fluids, enough so that your urine is light yellow or clear like water. This helps you urinate often, which clears bacteria from your system. If you have kidney, heart, or liver disease and have to limit fluids, talk with your doctor before you increase the amount of fluids you drink. · Urinate when you first have the urge. · Urinate right after you have sex. This is the best way for women to avoid UTIs. · When going to the bathroom, wipe from front to back to keep bacteria from entering the vagina or urethra. When should you call for help? Call your doctor now or seek immediate medical care if:  · Symptoms such as fever, chills, nausea, or vomiting get worse or appear for the first time. · You have new pain in your back just below your rib cage. This is called flank pain. · There is new blood or pus in your urine. · You have any problems with your antibiotic medicine. Watch closely for changes in your health, and be sure to contact your doctor if:  · You are not getting better after 1 day (24 hours). · You have new symptoms, such as blood in your urine. Where can you learn more? Go to http://coleman-kalia.info/. Enter M982 in the search box to learn more about \"Urinary Tract Infection in Pregnancy: Care Instructions. \"  Current as of: March 16, 2017  Content Version: 11.3  © 4692-2003 [employer]. Care instructions adapted under license by [employer] (which disclaims liability or warranty for this information). If you have questions about a medical condition or this instruction, always ask your healthcare professional. [place] [address] any warranty or liability for your use of this information.

## 2017-07-28 NOTE — ED PROVIDER NOTES
HPI Comments: 30-year-old female who is approximately 4-6 weeks pregnant presents to the emergency department for one week of left back pain and urinary urgency. Patient states she has a history of kidney stones. Patient also has a history of UTI. Patient denies any dysuria. She does report urinary urgency. She denies any fever or chills. No nausea or vomiting. No abdominal pain or cramping. No vaginal bleeding or vaginal discharge. No difficulty urinating. Patient has tried Tylenol with no relief. No chest pain, shortness of breath or shoulder pain. No alleviating factors. Pain is aching in back. Pain rated 7/10. Mild radiation to flank. No radiation into buttocks or legs. No difficulty urinating, no loss of bowel or bladder control. No saddle anesthesia. Social hx  Nonsmoker  No alcohol      Patient is a 28 y.o. female presenting with flank pain and difficulty urinating. The history is provided by the patient. Flank Pain    Pertinent negatives include no chest pain, no fever, no numbness, no headaches, no abdominal pain and no dysuria. Dysuria    Associated symptoms include frequency, urgency and flank pain. Pertinent negatives include no chills, no nausea, no vomiting, no vaginal discharge and no abdominal pain. Past Medical History:   Diagnosis Date    Psychiatric disorder        Past Surgical History:   Procedure Laterality Date    HX GYN      left ovarian cyst removal         Family History:   Problem Relation Age of Onset    Diabetes Maternal Grandmother     Hypertension Maternal Grandmother        Social History     Social History    Marital status:      Spouse name: N/A    Number of children: N/A    Years of education: N/A     Occupational History    Not on file.      Social History Main Topics    Smoking status: Never Smoker    Smokeless tobacco: Never Used    Alcohol use No    Drug use: No    Sexual activity: Yes     Partners: Male     Other Topics Concern    Not on file Social History Narrative         ALLERGIES: Amoxicillin    Review of Systems   Constitutional: Negative for chills and fever. Respiratory: Negative for cough and shortness of breath. Cardiovascular: Negative for chest pain and palpitations. Gastrointestinal: Negative for abdominal pain, blood in stool, diarrhea, nausea and vomiting. Genitourinary: Positive for flank pain, frequency and urgency. Negative for decreased urine volume, difficulty urinating, dysuria, vaginal bleeding, vaginal discharge and vaginal pain. Musculoskeletal: Negative for arthralgias, myalgias, neck pain and neck stiffness. Skin: Negative for rash and wound. Neurological: Negative for dizziness, numbness and headaches. All other systems reviewed and are negative. Vitals:    07/27/17 1903   BP: 132/75   Pulse: 81   Resp: 17   Temp: 99.1 °F (37.3 °C)   SpO2: 100%   Weight: 77.6 kg (171 lb)   Height: 5' 6\" (1.676 m)            Physical Exam   Constitutional: She is oriented to person, place, and time. She appears well-developed and well-nourished. No distress. HENT:   Head: Normocephalic and atraumatic. Right Ear: External ear normal.   Left Ear: External ear normal.   Eyes: Conjunctivae and EOM are normal. Pupils are equal, round, and reactive to light. Neck: Normal range of motion. Neck supple. Cardiovascular: Normal rate, regular rhythm and normal heart sounds. Pulmonary/Chest: Effort normal and breath sounds normal. No respiratory distress. She has no wheezes. Abdominal: Soft. Normal appearance and bowel sounds are normal. She exhibits no shifting dullness, no distension, no pulsatile liver, no abdominal bruit, no pulsatile midline mass and no mass. There is no hepatosplenomegaly, splenomegaly or hepatomegaly. There is no tenderness. There is no rigidity, no rebound, no guarding, no CVA tenderness, no tenderness at McBurney's point and negative Carlisle's sign. Abdomen soft, nontender to palpation.   No peritoneal signs   Musculoskeletal: Normal range of motion. She exhibits no edema or tenderness. Neurological: She is alert and oriented to person, place, and time. She has normal reflexes. No cranial nerve deficit. Coordination normal.   Skin: Skin is warm and dry. No rash noted. No erythema. Psychiatric: She has a normal mood and affect. Her behavior is normal. Judgment and thought content normal.   Nursing note and vitals reviewed. MDM  Number of Diagnoses or Management Options  Acute cystitis without hematuria:   Diagnosis management comments: 41-year-old female presenting for one week of urinary urgency back pain. History of kidney stones. Abdomen is soft and nontender. She is afebrile. She is T. tenderness. She is in no distress. Plan: Renal ultrasound, labs, urinalysis    10:19 PM  US negative for acute process. UA suggestive of UTI. Patient is well hydrated, well appearing, and in no respiratory distress. Physical exam is reassuring, and without signs of serious illness. With patient being pregnant will treat with keflex and have followup with ob/gyn    Patient's results have been reviewed with them. Patient and/or family have verbally conveyed their understanding and agreement of the patient's signs, symptoms, diagnosis, treatment and prognosis and additionally agree to follow up as recommended or return to the Emergency Room should their condition change prior to follow-up. Discharge instructions have also been provided to the patient with some educational information regarding their diagnosis as well a list of reasons why they would want to return to the ER prior to their follow-up appointment should their condition change.                Amount and/or Complexity of Data Reviewed  Discuss the patient with other providers: yes (ER attending-[facility])    Patient Progress  Patient progress: stable    ED Course       Procedures  Pt case including HPI, PE, and all available lab and radiology results has been discussed with attending physician. Opportunity to evaluate patient has been provided to ER attending. Discharge and prescription plan has been agreed upon.

## 2017-07-28 NOTE — ED NOTES
Patient received discharge instructions by PA and nurse. Patient verbalized understanding of medication use and other discharge instructions. Updated vitals, IV DC and patient ambulated out of ED with steady gait, showing no signs of distress. Pt reports relief of most intense pain. All questions answered.

## 2017-07-29 LAB
BACTERIA SPEC CULT: ABNORMAL
CC UR VC: ABNORMAL
SERVICE CMNT-IMP: ABNORMAL

## 2018-01-12 LAB — GRBS, EXTERNAL: POSITIVE

## 2018-02-01 ENCOUNTER — ANESTHESIA EVENT (OUTPATIENT)
Dept: LABOR AND DELIVERY | Age: 33
DRG: 560 | End: 2018-02-01
Payer: MEDICAID

## 2018-02-01 ENCOUNTER — ANESTHESIA (OUTPATIENT)
Dept: LABOR AND DELIVERY | Age: 33
DRG: 560 | End: 2018-02-01
Payer: MEDICAID

## 2018-02-01 ENCOUNTER — HOSPITAL ENCOUNTER (INPATIENT)
Age: 33
LOS: 2 days | Discharge: HOME | DRG: 560 | End: 2018-02-03
Attending: SPECIALIST | Admitting: SPECIALIST
Payer: MEDICAID

## 2018-02-01 DIAGNOSIS — Z34.90 PREGNANCY, UNSPECIFIED GESTATIONAL AGE: Primary | ICD-10-CM

## 2018-02-01 LAB
BASOPHILS # BLD: 0 K/UL (ref 0–0.1)
BASOPHILS NFR BLD: 0 % (ref 0–1)
DIFFERENTIAL METHOD BLD: ABNORMAL
EOSINOPHIL # BLD: 0 K/UL (ref 0–0.4)
EOSINOPHIL NFR BLD: 1 % (ref 0–7)
ERYTHROCYTE [DISTWIDTH] IN BLOOD BY AUTOMATED COUNT: 14.7 % (ref 11.5–14.5)
HCT VFR BLD AUTO: 32.8 % (ref 35–47)
HGB BLD-MCNC: 11.2 G/DL (ref 11.5–16)
IMM GRANULOCYTES # BLD: 0.1 K/UL (ref 0–0.04)
IMM GRANULOCYTES NFR BLD AUTO: 2 % (ref 0–0.5)
LYMPHOCYTES # BLD: 1.8 K/UL (ref 0.8–3.5)
LYMPHOCYTES NFR BLD: 30 % (ref 12–49)
MCH RBC QN AUTO: 30.9 PG (ref 26–34)
MCHC RBC AUTO-ENTMCNC: 34.1 G/DL (ref 30–36.5)
MCV RBC AUTO: 90.4 FL (ref 80–99)
MONOCYTES # BLD: 0.4 K/UL (ref 0–1)
MONOCYTES NFR BLD: 7 % (ref 5–13)
NEUTS SEG # BLD: 3.6 K/UL (ref 1.8–8)
NEUTS SEG NFR BLD: 60 % (ref 32–75)
NRBC # BLD: 0 K/UL (ref 0–0.01)
NRBC BLD-RTO: 0 PER 100 WBC
PLATELET # BLD AUTO: 163 K/UL (ref 150–400)
PMV BLD AUTO: 12.2 FL (ref 8.9–12.9)
RBC # BLD AUTO: 3.63 M/UL (ref 3.8–5.2)
WBC # BLD AUTO: 6 K/UL (ref 3.6–11)

## 2018-02-01 PROCEDURE — 74011000250 HC RX REV CODE- 250

## 2018-02-01 PROCEDURE — 77030018749 HC HK AMNIO DISP DERY -A

## 2018-02-01 PROCEDURE — 75410000003 HC RECOV DEL/VAG/CSECN EA 0.5 HR

## 2018-02-01 PROCEDURE — 75410000002 HC LABOR FEE PER 1 HR

## 2018-02-01 PROCEDURE — 74011250636 HC RX REV CODE- 250/636: Performed by: SPECIALIST

## 2018-02-01 PROCEDURE — 77030007880 HC KT SPN EPDRL BBMI -B

## 2018-02-01 PROCEDURE — 76060000078 HC EPIDURAL ANESTHESIA

## 2018-02-01 PROCEDURE — 88307 TISSUE EXAM BY PATHOLOGIST: CPT | Performed by: SPECIALIST

## 2018-02-01 PROCEDURE — 77030034849

## 2018-02-01 PROCEDURE — 75410000000 HC DELIVERY VAGINAL/SINGLE

## 2018-02-01 PROCEDURE — 77030014125 HC TY EPDRL BBMI -B: Performed by: ANESTHESIOLOGY

## 2018-02-01 PROCEDURE — 74011250636 HC RX REV CODE- 250/636: Performed by: ANESTHESIOLOGY

## 2018-02-01 PROCEDURE — 65410000002 HC RM PRIVATE OB

## 2018-02-01 PROCEDURE — 36415 COLL VENOUS BLD VENIPUNCTURE: CPT | Performed by: SPECIALIST

## 2018-02-01 PROCEDURE — 10907ZC DRAINAGE OF AMNIOTIC FLUID, THERAPEUTIC FROM PRODUCTS OF CONCEPTION, VIA NATURAL OR ARTIFICIAL OPENING: ICD-10-PCS | Performed by: SPECIALIST

## 2018-02-01 PROCEDURE — 74011250636 HC RX REV CODE- 250/636

## 2018-02-01 PROCEDURE — 85025 COMPLETE CBC W/AUTO DIFF WBC: CPT | Performed by: SPECIALIST

## 2018-02-01 PROCEDURE — 3E0R3NZ INTRODUCTION OF ANALGESICS, HYPNOTICS, SEDATIVES INTO SPINAL CANAL, PERCUTANEOUS APPROACH: ICD-10-PCS | Performed by: ANESTHESIOLOGY

## 2018-02-01 PROCEDURE — 74011250637 HC RX REV CODE- 250/637: Performed by: SPECIALIST

## 2018-02-01 RX ORDER — BUPIVACAINE HYDROCHLORIDE AND EPINEPHRINE 5; 5 MG/ML; UG/ML
INJECTION, SOLUTION EPIDURAL; INTRACAUDAL; PERINEURAL
Status: DISPENSED
Start: 2018-02-01 | End: 2018-02-01

## 2018-02-01 RX ORDER — FENTANYL/BUPIVACAINE/NS/PF 2-1250MCG
1-16 PREFILLED PUMP RESERVOIR EPIDURAL CONTINUOUS
Status: DISCONTINUED | OUTPATIENT
Start: 2018-02-01 | End: 2018-02-01 | Stop reason: HOSPADM

## 2018-02-01 RX ORDER — CEFAZOLIN SODIUM/WATER 2 G/20 ML
2 SYRINGE (ML) INTRAVENOUS EVERY 8 HOURS
Status: DISCONTINUED | OUTPATIENT
Start: 2018-02-01 | End: 2018-02-03 | Stop reason: HOSPADM

## 2018-02-01 RX ORDER — NALOXONE HYDROCHLORIDE 0.4 MG/ML
0.4 INJECTION, SOLUTION INTRAMUSCULAR; INTRAVENOUS; SUBCUTANEOUS AS NEEDED
Status: DISCONTINUED | OUTPATIENT
Start: 2018-02-01 | End: 2018-02-03 | Stop reason: HOSPADM

## 2018-02-01 RX ORDER — ZOLPIDEM TARTRATE 5 MG/1
5 TABLET ORAL
Status: DISCONTINUED | OUTPATIENT
Start: 2018-02-01 | End: 2018-02-03 | Stop reason: HOSPADM

## 2018-02-01 RX ORDER — ACETAMINOPHEN 325 MG/1
650 TABLET ORAL
Status: DISCONTINUED | OUTPATIENT
Start: 2018-02-01 | End: 2018-02-03 | Stop reason: HOSPADM

## 2018-02-01 RX ORDER — CEFAZOLIN SODIUM/WATER 2 G/20 ML
2 SYRINGE (ML) INTRAVENOUS EVERY 4 HOURS
Status: DISCONTINUED | OUTPATIENT
Start: 2018-02-01 | End: 2018-02-01

## 2018-02-01 RX ORDER — BUPIVACAINE HYDROCHLORIDE 2.5 MG/ML
INJECTION, SOLUTION EPIDURAL; INFILTRATION; INTRACAUDAL
Status: DISPENSED
Start: 2018-02-01 | End: 2018-02-01

## 2018-02-01 RX ORDER — IBUPROFEN 400 MG/1
800 TABLET ORAL EVERY 8 HOURS
Status: DISCONTINUED | OUTPATIENT
Start: 2018-02-01 | End: 2018-02-03 | Stop reason: HOSPADM

## 2018-02-01 RX ORDER — HYDROCODONE BITARTRATE AND ACETAMINOPHEN 5; 325 MG/1; MG/1
1 TABLET ORAL
Status: DISCONTINUED | OUTPATIENT
Start: 2018-02-01 | End: 2018-02-03 | Stop reason: HOSPADM

## 2018-02-01 RX ORDER — SODIUM CHLORIDE, SODIUM LACTATE, POTASSIUM CHLORIDE, CALCIUM CHLORIDE 600; 310; 30; 20 MG/100ML; MG/100ML; MG/100ML; MG/100ML
125 INJECTION, SOLUTION INTRAVENOUS CONTINUOUS
Status: DISCONTINUED | OUTPATIENT
Start: 2018-02-01 | End: 2018-02-03 | Stop reason: HOSPADM

## 2018-02-01 RX ORDER — SODIUM CHLORIDE 0.9 % (FLUSH) 0.9 %
5-10 SYRINGE (ML) INJECTION EVERY 8 HOURS
Status: DISCONTINUED | OUTPATIENT
Start: 2018-02-01 | End: 2018-02-03 | Stop reason: HOSPADM

## 2018-02-01 RX ORDER — FENTANYL CITRATE 50 UG/ML
INJECTION, SOLUTION INTRAMUSCULAR; INTRAVENOUS AS NEEDED
Status: DISCONTINUED | OUTPATIENT
Start: 2018-02-01 | End: 2018-02-01 | Stop reason: HOSPADM

## 2018-02-01 RX ORDER — FENTANYL/BUPIVACAINE/NS/PF 2-1250MCG
PREFILLED PUMP RESERVOIR EPIDURAL
Status: DISPENSED
Start: 2018-02-01 | End: 2018-02-01

## 2018-02-01 RX ORDER — EPHEDRINE SULFATE 50 MG/ML
10 INJECTION, SOLUTION INTRAVENOUS
Status: DISCONTINUED | OUTPATIENT
Start: 2018-02-01 | End: 2018-02-01 | Stop reason: HOSPADM

## 2018-02-01 RX ORDER — OXYTOCIN IN 5 % DEXTROSE 30/500 ML
PLASTIC BAG, INJECTION (ML) INTRAVENOUS
Status: COMPLETED
Start: 2018-02-01 | End: 2018-02-01

## 2018-02-01 RX ORDER — SODIUM CHLORIDE 0.9 % (FLUSH) 0.9 %
5-10 SYRINGE (ML) INJECTION AS NEEDED
Status: DISCONTINUED | OUTPATIENT
Start: 2018-02-01 | End: 2018-02-03 | Stop reason: HOSPADM

## 2018-02-01 RX ORDER — SODIUM CHLORIDE 0.9 % (FLUSH) 0.9 %
5-10 SYRINGE (ML) INJECTION AS NEEDED
Status: DISCONTINUED | OUTPATIENT
Start: 2018-02-01 | End: 2018-02-01 | Stop reason: HOSPADM

## 2018-02-01 RX ORDER — OXYTOCIN IN 5 % DEXTROSE 30/500 ML
1-25 PLASTIC BAG, INJECTION (ML) INTRAVENOUS
Status: DISCONTINUED | OUTPATIENT
Start: 2018-02-01 | End: 2018-02-03 | Stop reason: HOSPADM

## 2018-02-01 RX ORDER — FENTANYL CITRATE 50 UG/ML
INJECTION, SOLUTION INTRAMUSCULAR; INTRAVENOUS
Status: DISPENSED
Start: 2018-02-01 | End: 2018-02-01

## 2018-02-01 RX ORDER — HYDROCORTISONE ACETATE PRAMOXINE HCL 2.5; 1 G/100G; G/100G
CREAM TOPICAL AS NEEDED
Status: DISCONTINUED | OUTPATIENT
Start: 2018-02-01 | End: 2018-02-03 | Stop reason: HOSPADM

## 2018-02-01 RX ORDER — SODIUM CHLORIDE 0.9 % (FLUSH) 0.9 %
5-10 SYRINGE (ML) INJECTION EVERY 8 HOURS
Status: DISCONTINUED | OUTPATIENT
Start: 2018-02-01 | End: 2018-02-01 | Stop reason: HOSPADM

## 2018-02-01 RX ORDER — OXYTOCIN/RINGER'S LACTATE 20/1000 ML
125-500 PLASTIC BAG, INJECTION (ML) INTRAVENOUS ONCE
Status: ACTIVE | OUTPATIENT
Start: 2018-02-01 | End: 2018-02-02

## 2018-02-01 RX ADMIN — FENTANYL CITRATE 100 MCG: 50 INJECTION, SOLUTION INTRAMUSCULAR; INTRAVENOUS at 11:24

## 2018-02-01 RX ADMIN — Medication 2 G: at 07:52

## 2018-02-01 RX ADMIN — IBUPROFEN 800 MG: 400 TABLET, FILM COATED ORAL at 22:18

## 2018-02-01 RX ADMIN — SODIUM CHLORIDE, SODIUM LACTATE, POTASSIUM CHLORIDE, AND CALCIUM CHLORIDE 125 ML/HR: 600; 310; 30; 20 INJECTION, SOLUTION INTRAVENOUS at 06:20

## 2018-02-01 RX ADMIN — SODIUM CHLORIDE, SODIUM LACTATE, POTASSIUM CHLORIDE, AND CALCIUM CHLORIDE 125 ML/HR: 600; 310; 30; 20 INJECTION, SOLUTION INTRAVENOUS at 11:20

## 2018-02-01 RX ADMIN — Medication 2 MILLI-UNITS: at 07:34

## 2018-02-01 RX ADMIN — SODIUM CHLORIDE, SODIUM LACTATE, POTASSIUM CHLORIDE, AND CALCIUM CHLORIDE 999 ML/HR: 600; 310; 30; 20 INJECTION, SOLUTION INTRAVENOUS at 10:38

## 2018-02-01 RX ADMIN — Medication 12 ML/HR: at 11:38

## 2018-02-01 RX ADMIN — Medication 2 G: at 11:46

## 2018-02-01 NOTE — LACTATION NOTE
This note was copied from a baby's chart. Infant to breast at 1735 with latch score of 8. Mom nursed 3 older boys for 2 months, 4 months and 6 months. Encouraged responding to feeding cues as well as wkaing to feed every 2 to 3 hours if sleepy. Infant nursing well in cradle position after birth. Mom given lanolin for prevetnion of nipple tenderness. Mom encouraged to pursue breastpump benefits. Initiated breastfeeding log and explained use.

## 2018-02-01 NOTE — H&P
History & Physical    Name: Kaiden Ortiz MRN: [id number]  SSN: [id number]    YOB: 1985  Age: 33 y.o. Sex: female      Subjective:     Estimated Date of Delivery: 18  OB History    Para Term  AB Living   6 3 3 0 2 1   SAB TAB Ectopic Molar Multiple Live Births   0 2 0  0 1      # Outcome Date GA Lbr Kristopher/2nd Weight Sex Delivery Anes PTL Lv   6 Current            5 Term 07/09/15 39w3d 06:10 / 00:08 3.31 kg M VAGINAL DELI EPIDURAL AN N ALEJNADRO   4 TAB            3 TAB            2 Term            1 Term                   Ms. Nathanael Katz is admitted with pregnancy at 39w0d for induction of labor due to favorable cervix at term. Prenatal course was normal.  Please see prenatal records for details. Past Medical History:   Diagnosis Date    Psychiatric disorder      Past Surgical History:   Procedure Laterality Date    HX GYN      left ovarian cyst removal     Social History     Occupational History    Not on file. Social History Main Topics    Smoking status: Never Smoker    Smokeless tobacco: Never Used    Alcohol use No    Drug use: No    Sexual activity: Yes     Partners: Male     Family History   Problem Relation Age of Onset    Diabetes Maternal Grandmother     Hypertension Maternal Grandmother        Allergies   Allergen Reactions    Amoxicillin Other (comments)     \"stomach pain\"     Prior to Admission medications    Medication Sig Start Date End Date Taking? Authorizing Provider   PNV No12-Iron-FA-DSS-OM-3 29 mg iron-1 mg -50 mg CPKD Take  by mouth. Yes Historical Provider   ondansetron hcl (ZOFRAN, AS HYDROCHLORIDE,) 4 mg tablet Take 1 Tab by mouth every eight (8) hours as needed for Nausea. 17   Josh Holloway MD   L. acidoph & paracasei- S therm- Bifido (KIMBERLY-Q) 8 billion cell cap cap Take 1 Cap by mouth daily.  OK to substitute over-the-counter probiotic for 30 days (Culturelle, Florastor, etc.) 16   Tray Greenwood MD   sertraline (ZOLOFT) 50 mg tablet Take 50 mg by mouth daily. Historical Provider   norethindrone-ethinyl estradiol (JUNEL FE 1/20) 1 mg-20 mcg (21)/75 mg (7) tab Take 1 Tab by mouth daily. Historical Provider        Review of Systems: A comprehensive review of systems was negative except for that written in the History of Present Illness. Objective:     Vitals:  Vitals:    02/01/18 0629 02/01/18 0630 02/01/18 0716   BP:  120/55 114/55   Pulse:  87 88   Resp:  16 18   Temp:  98.9 °F (37.2 °C) 98 °F (36.7 °C)   SpO2:  99% 99%   Weight: 83.9 kg (185 lb)     Height: 5' 6\" (1.676 m)          Physical Exam:  Patient without distress. Heart: Regular rate and rhythm or S1S2 present  Lung: clear to auscultation throughout lung fields, no wheezes, no rales, no rhonchi and normal respiratory effort  Abdomen: soft, nontender  Cervical Exam: 3 cm dilated    70% effaced    -1 station    Presenting Part: cephalic  Membranes:  Intact  Fetal Heart Rate: Reactive          Prenatal Labs:   Lab Results   Component Value Date/Time    ABO/Rh(D) A NEGATIVE 07/10/2015 03:53 AM    Rubella, External immune 06/16/2017    HBsAg, External neg 06/16/2017    HIV, External non reactive 06/16/2017    RPR, External non reactive 06/16/2017    Gonorrhea, External negative 12/23/2014    Chlamydia, External negative 12/23/2014    ABO,Rh A negative 12/23/2014    GrBStrep, External positive 01/12/2018       Impression/Plan:     Active Problems:    Pregnancy (7/9/2015)         Plan: Admit for induction of labor. Group B Strep positive, will treat prophylactically with ampicillin.     Signed By:  Lyndsey [place] MD     February 1, 2018

## 2018-02-01 NOTE — IP AVS SNAPSHOT
Summary of Care Report The Summary of Care report has been created to help improve care coordination. Users with access to [employer] or [address] (Web-based application) may access additional patient information including the Discharge Summary. If you are not currently a [address] user and need more information, please call the number listed below in the [place] [address] section and ask to be connected with Medical Records. Facility Information Name Address Phone [address] [address] [phone] Patient Information Patient Name Sex LAURENCE Bueno ([id number]) Female 1985 Discharge Information Admitting Provider Service Area Unit July Chapa MD / [phone] [employer] [facility] Mother Infant / [phone] Discharge Provider Discharge Date/Time Discharge Disposition Destination (none) 2/3/2018 Morning (Pending) AHR (none) Patient Language Language ENGLISH [13] Hospital Problems as of 2/3/2018  Reviewed: 2018 11:09 AM by Kulwinder Hilario MD  
  
  
  
 Class Noted - Resolved Last Modified POA Active Problems Pregnancy  2015 - Present 2018 by July Chapa MD Unknown Entered by July Chapa MD  
  
Non-Hospital Problems as of 2/3/2018  Reviewed: 2018 11:09 AM by Kulwinder Hilario MD  
  
  
  
 Class Noted - Resolved Last Modified Active Problems Sepsis due to urinary tract infection ([facility] [address]  2016 - Present 2016 by Mikaela Carrillo MD  
  Entered by Mikaela Carrillo MD  
  
You are allergic to the following Allergen Reactions Amoxicillin Other (comments) \"stomach pain\" Current Discharge Medication List  
  
START taking these medications Dose & Instructions Dispensing Information Comments HYDROcodone-acetaminophen 5-325 mg per tablet Commonly known as:  Paul Daring Dose:  1 Tab Take 1 Tab by mouth every four (4) hours as needed. Max Daily Amount: 6 Tabs. Quantity:  20 Tab Refills:  0  
   
 ibuprofen 800 mg tablet Commonly known as:  MOTRIN Dose:  800 mg Take 1 Tab by mouth every eight (8) hours as needed for Pain. Quantity:  30 Tab Refills:  0 ASK your doctor about these medications Dose & Instructions Dispensing Information Comments L. acidoph & paracasei- S therm- Bifido 8 billion cell Cap cap Commonly known as:  KIMBERLY-Q Dose:  1 Cap Take 1 Cap by mouth daily. OK to substitute over-the-counter probiotic for 30 days (Culturelle, Florastor, etc.) Quantity:  30 Cap Refills:  0  
   
 norethindrone-ethinyl estradiol 1 mg-20 mcg (21)/75 mg (7) Tab Commonly known as:  Prisca Geeta FE 1/20 Dose:  1 Tab Take 1 Tab by mouth daily. Refills:  0  
   
 ondansetron hcl 4 mg tablet Commonly known as:  ZOFRAN (AS HYDROCHLORIDE) Dose:  4 mg Take 1 Tab by mouth every eight (8) hours as needed for Nausea. Quantity:  20 Tab Refills:  0  
   
 PNV No12-Iron-FA-DSS-OM-3 29 mg iron-1 mg -50 mg Cpkd Take  by mouth. Refills:  0  
   
 sertraline 50 mg tablet Commonly known as:  ZOLOFT Dose:  50 mg Take 50 mg by mouth daily. Refills:  0 Current Immunizations Name Date Rho(D) Immune Globulin - IM 2/2/2018, 7/10/2015, 12/19/2014 Tdap  Deferred (), 7/11/2015 Surgery Information ID Date/Time Status Primary Surgeon All Procedures Location [id number] 2/1/2018 Complete   ZZANESTHESIA [facility] - DO NOT SCHEDULE Follow-up Information Follow up With Details Comments Contact Info Provider Unknown   Patient not available to ask Discharge Instructions Postpartum: Care Instructions Your Care Instructions After childbirth (postpartum period), your body goes through many changes. Some of these changes happen over several weeks. In the hours after delivery, your body will begin to recover from childbirth while it prepares to breastfeed your . You may feel emotional during this time. Your hormones can shift your mood without warning for no clear reason. In the first couple of weeks after childbirth, many women have emotions that change from happy to sad. You may find it hard to sleep. You may cry a lot. This is called the \"baby blues. \" These overwhelming emotions often go away within a couple of days or weeks. But it's important to discuss your feelings with your doctor. It is easy to get too tired and overwhelmed during the first weeks after childbirth. Don't try to do too much. Get rest whenever you can, accept help from others, and eat well and drink plenty of fluids. About 4 to 6 weeks after your baby's birth, you will have a follow-up visit with your doctor. This visit is your time to talk to your doctor about anything you are concerned or curious about. Follow-up care is a key part of your treatment and safety. Be sure to make and go to all appointments, and call your doctor if you are having problems. It's also a good idea to know your test results and keep a list of the medicines you take. How can you care for yourself at home? · Sleep or rest when your baby sleeps. · Get help with household chores from family or friends, if you can. Do not try to do it all yourself. · If you have hemorrhoids or swelling or pain around the opening of your vagina, try using cold and heat. You can put ice or a cold pack on the area for 10 to 20 minutes at a time. Put a thin cloth between the ice and your skin. Also try sitting in a few inches of warm water (sitz bath) 3 times a day and after bowel movements. · Take pain medicines exactly as directed. ¨ If the doctor gave you a prescription medicine for pain, take it as prescribed. ¨ If you are not taking a prescription pain medicine, ask your doctor if you can take an over-the-counter medicine. · Eat more fiber to avoid constipation. Include foods such as whole-grain breads and cereals, raw vegetables, raw and dried fruits, and beans. · Drink plenty of fluids, enough so that your urine is light yellow or clear like water. If you have kidney, heart, or liver disease and have to limit fluids, talk with your doctor before you increase the amount of fluids you drink. · Do not rinse inside your vagina with fluids (douche). · If you have stitches, keep the area clean by pouring or spraying warm water over the area outside your vagina and anus after you use the toilet. · Keep a list of questions to bring to your postpartum visit. Your questions might be about: 
¨ Changes in your breasts, such as lumps or soreness. ¨ When to expect your menstrual period to start again. ¨ What form of birth control is best for you. ¨ Weight you have put on during the pregnancy. ¨ Exercise options. ¨ What foods and drinks are best for you, especially if you are breastfeeding. ¨ Problems you might be having with breastfeeding. ¨ When you can have sex. Some women may want to talk about lubricants for the vagina. ¨ Any feelings of sadness or restlessness that you are having. When should you call for help? Call 911 anytime you think you may need emergency care. For example, call if: 
? · You have thoughts of harming yourself, your baby, or another person. ? · You passed out (lost consciousness). ?Call your doctor now or seek immediate medical care if: 
? · Your vaginal bleeding seems to be getting heavier. ? · You are dizzy or lightheaded, or you feel like you may faint. ? · You have a fever. ? Watch closely for changes in your health, and be sure to contact your doctor if: 
? · You have new or worse vaginal discharge. ? · You feel sad or depressed. ? · You are having problems with your breasts or breastfeeding. Where can you learn more? Go to http://coleman-kalia.info/. Enter A064 in the search box to learn more about \"Postpartum: Care Instructions. \" Current as of: March 16, 2017 Content Version: 11.4 © 6870-2278 [employer]. Care instructions adapted under license by [employer] (which disclaims liability or warranty for this information). If you have questions about a medical condition or this instruction, always ask your healthcare professional. [place] [address] any warranty or liability for your use of this information. Chart Review Routing History Recipient Method Report Sent By Tanisha Prajapati MD  
Patient not available to ask [address] Mail IP Auto Routed Notes Mic Austin  [phone] 7/27/2015  4:44 AM 07/27/2015 Mic Austin MD  
Phone: [phone] In [employer] IP Auto Routed [facility] Kimo Dimas MD [facility] 6/15/2016  7:41 AM 06/15/2016 Kwan Black MD  
Fax: [phone] Phone: [phone] Fax Jose Antonio Pacheco MD NOTES AUTO ROUTING REPORT Belem Kwan MD [[id number]] 8/1/2016  7:17 PM 08/01/2016 Kwan Black MD  
Fax: [phone] Phone: [phone] Fax Jose Antonio Pacheco MD NOTES AUTO ROUTING REPORT Irlanda Luna MD [[id number]] 8/5/2016  8:03 AM 08/05/2016 Kwan Black MD  
Fax: [phone] Phone: [phone] Fax Jose Antonio Pacheco MD NOTES AUTO ROUTING REPORT Irlanda Luna MD [[id number]] 8/5/2016  8:11 AM 08/05/2016 Kwan Black MD  
Fax: [phone] Phone: [phone] Fax Jose Antonio Pacheco MD NOTES AUTO ROUTING REPORT Belem Kwan MD [[id number]] 8/9/2016  8:05 AM 08/09/2016 Concepcion Rolon MD  
Phone: [phone] In Basket IP Auto Routed Jennifer Sprague MD [[id number]] 8/9/2016 11:16 PM 08/09/2016 Provider Unknown, MD  
Patient not available to ask [address] Mail IP Auto Routed Notes Suzanne Costa  [phone] 2/1/2018  7:30 AM 02/01/2018

## 2018-02-01 NOTE — PROGRESS NOTES
0700: Bedside shift change report given to Wilmer RN by ANASTASIA Mcqueen RN. Report included the following information SBAR, Kardex, Intake/Output, and MAR. Hourly Rounding performed by RN.   Rebeca Boo RN

## 2018-02-01 NOTE — ANESTHESIA PROCEDURE NOTES
Epidural Block    Start time: 2/1/2018 11:17 AM  End time: 2/1/2018 11:26 AM  Performed by: Neftaly Montalvo  Authorized by: Neftaly Montalvo     Pre-Procedure  Indication: labor epidural    Preanesthetic Checklist: risks and benefits discussed, site marked and timeout performed    Timeout Time: 11:17        Epidural:   Patient position:  Seated  Prep region:  Lumbar  Prep: Chlorhexidine    Location:  L3-4    Needle and Epidural Catheter:   Needle Type:  Tuohy  Needle Gauge:  17 G  Injection Technique:  Loss of resistance using air  Attempts:  1  Catheter Size:  19 G  Depth in Epidural Space (cm):  4  Events: no blood with aspiration, no cerebrospinal fluid with aspiration, no paresthesia and negative aspiration test    Test Dose:  Lidocaine 1.0% w/ epi    Assessment:   Catheter Secured:  Tegaderm and tape  Insertion:  Uncomplicated  Patient tolerance:  Patient tolerated the procedure well with no immediate complications

## 2018-02-01 NOTE — IP AVS SNAPSHOT
[address] [facility] 
[phone] Patient: Jason Dumont MRN: [id number] :1985 A check emerson indicates which time of day the medication should be taken. My Medications START taking these medications Instructions Each Dose to Equal  
 Morning Noon Evening Bedtime HYDROcodone-acetaminophen 5-325 mg per tablet Commonly known as:  Francine Romero Your last dose was: Your next dose is: Take 1 Tab by mouth every four (4) hours as needed. Max Daily Amount: 6 Tabs. 1 Tab  
    
   
   
   
  
 ibuprofen 800 mg tablet Commonly known as:  MOTRIN Your last dose was: Your next dose is: Take 1 Tab by mouth every eight (8) hours as needed for Pain. 800 mg ASK your doctor about these medications Instructions Each Dose to Equal  
 Morning Noon Evening Bedtime L. acidoph & paracasei- S therm- Bifido 8 billion cell Cap cap Commonly known as:  KIMBERLY-Q Your last dose was: Your next dose is: Take 1 Cap by mouth daily. OK to substitute over-the-counter probiotic for 30 days (Culturelle, Florastor, etc.) 1 Cap  
    
   
   
   
  
 norethindrone-ethinyl estradiol 1 mg-20 mcg (21)/75 mg (7) Tab Commonly known as:  Leandrew Caldron FE  Your last dose was: Your next dose is: Take 1 Tab by mouth daily. 1 Tab  
    
   
   
   
  
 ondansetron hcl 4 mg tablet Commonly known as:  ZOFRAN (AS HYDROCHLORIDE) Your last dose was: Your next dose is: Take 1 Tab by mouth every eight (8) hours as needed for Nausea. 4 mg PNV No12-Iron-FA-DSS-OM-3 29 mg iron-1 mg -50 mg Cpkd Your last dose was: Your next dose is: Take  by mouth. sertraline 50 mg tablet Commonly known as:  ZOLOFT  
   
 Your last dose was: Your next dose is: Take 50 mg by mouth daily. 50 mg Where to Get Your Medications Information on where to get these meds will be given to you by the nurse or doctor. ! Ask your nurse or doctor about these medications HYDROcodone-acetaminophen 5-325 mg per tablet  
 ibuprofen 800 mg tablet

## 2018-02-01 NOTE — PROGRESS NOTES
Anesthesia in to redose patient. 1653:  Dr. Tabitha Myesr at bedside, SVE done, patient complete, bed broken down, began pushing.

## 2018-02-01 NOTE — PROGRESS NOTES
1487: Pt admitted to room # [phone] for elective induction of labor by Dr. Saray Arredondo. , 39w. Reports +FM, denies vaginal bleeding or discharge. GBS + with allergy to amoxicillin and cervix was checked last week in office. Oriented to room, EFM applied, IV inserted, labs sent, and consents discussed and signed.

## 2018-02-01 NOTE — IP AVS SNAPSHOT
[address] [facility] 
[phone] Patient: Kaiden Ortiz MRN: [id number] :1985 About your hospitalization You were admitted on:  2018 You last received care in the:  [facility] You were discharged on:  February 3, 2018 Why you were hospitalized Your primary diagnosis was:  Not on File Your diagnoses also included:  Pregnancy Follow-up Information Follow up With Details Comments Contact Info Provider Unknown   Patient not available to ask Discharge Orders None A check emerson indicates which time of day the medication should be taken. My Medications START taking these medications Instructions Each Dose to Equal  
 Morning Noon Evening Bedtime HYDROcodone-acetaminophen 5-325 mg per tablet Commonly known as:  Natasha Pont Your last dose was: Your next dose is: Take 1 Tab by mouth every four (4) hours as needed. Max Daily Amount: 6 Tabs. 1 Tab  
    
   
   
   
  
 ibuprofen 800 mg tablet Commonly known as:  MOTRIN Your last dose was: Your next dose is: Take 1 Tab by mouth every eight (8) hours as needed for Pain. 800 mg ASK your doctor about these medications Instructions Each Dose to Equal  
 Morning Noon Evening Bedtime L. acidoph & paracasei- S therm- Bifido 8 billion cell Cap cap Commonly known as:  KIMBERLY-Q Your last dose was: Your next dose is: Take 1 Cap by mouth daily. OK to substitute over-the-counter probiotic for 30 days (Culturelle, Florastor, etc.) 1 Cap  
    
   
   
   
  
 norethindrone-ethinyl estradiol 1 mg-20 mcg (21)/75 mg (7) Tab Commonly known as:  Lore MEHTA  Your last dose was: Your next dose is: Take 1 Tab by mouth daily. 1 Tab ondansetron hcl 4 mg tablet Commonly known as:  ZOFRAN (AS HYDROCHLORIDE) Your last dose was: Your next dose is: Take 1 Tab by mouth every eight (8) hours as needed for Nausea. 4 mg PNV No12-Iron-FA-DSS-OM-3 29 mg iron-1 mg -50 mg Cpkd Your last dose was: Your next dose is: Take  by mouth. sertraline 50 mg tablet Commonly known as:  ZOLOFT Your last dose was: Your next dose is: Take 50 mg by mouth daily. 50 mg Where to Get Your Medications Information on where to get these meds will be given to you by the nurse or doctor. ! Ask your nurse or doctor about these medications HYDROcodone-acetaminophen 5-325 mg per tablet  
 ibuprofen 800 mg tablet Discharge Instructions Postpartum: Care Instructions Your Care Instructions After childbirth (postpartum period), your body goes through many changes. Some of these changes happen over several weeks. In the hours after delivery, your body will begin to recover from childbirth while it prepares to breastfeed your . You may feel emotional during this time. Your hormones can shift your mood without warning for no clear reason. In the first couple of weeks after childbirth, many women have emotions that change from happy to sad. You may find it hard to sleep. You may cry a lot. This is called the \"baby blues. \" These overwhelming emotions often go away within a couple of days or weeks. But it's important to discuss your feelings with your doctor. It is easy to get too tired and overwhelmed during the first weeks after childbirth. Don't try to do too much. Get rest whenever you can, accept help from others, and eat well and drink plenty of fluids.  
About 4 to 6 weeks after your baby's birth, you will have a follow-up visit with your doctor. This visit is your time to talk to your doctor about anything you are concerned or curious about. Follow-up care is a key part of your treatment and safety. Be sure to make and go to all appointments, and call your doctor if you are having problems. It's also a good idea to know your test results and keep a list of the medicines you take. How can you care for yourself at home? · Sleep or rest when your baby sleeps. · Get help with household chores from family or friends, if you can. Do not try to do it all yourself. · If you have hemorrhoids or swelling or pain around the opening of your vagina, try using cold and heat. You can put ice or a cold pack on the area for 10 to 20 minutes at a time. Put a thin cloth between the ice and your skin. Also try sitting in a few inches of warm water (sitz bath) 3 times a day and after bowel movements. · Take pain medicines exactly as directed. ¨ If the doctor gave you a prescription medicine for pain, take it as prescribed. ¨ If you are not taking a prescription pain medicine, ask your doctor if you can take an over-the-counter medicine. · Eat more fiber to avoid constipation. Include foods such as whole-grain breads and cereals, raw vegetables, raw and dried fruits, and beans. · Drink plenty of fluids, enough so that your urine is light yellow or clear like water. If you have kidney, heart, or liver disease and have to limit fluids, talk with your doctor before you increase the amount of fluids you drink. · Do not rinse inside your vagina with fluids (douche). · If you have stitches, keep the area clean by pouring or spraying warm water over the area outside your vagina and anus after you use the toilet. · Keep a list of questions to bring to your postpartum visit. Your questions might be about: 
¨ Changes in your breasts, such as lumps or soreness. ¨ When to expect your menstrual period to start again. ¨ What form of birth control is best for you. ¨ Weight you have put on during the pregnancy. ¨ Exercise options. ¨ What foods and drinks are best for you, especially if you are breastfeeding. ¨ Problems you might be having with breastfeeding. ¨ When you can have sex. Some women may want to talk about lubricants for the vagina. ¨ Any feelings of sadness or restlessness that you are having. When should you call for help? Call 911 anytime you think you may need emergency care. For example, call if: 
? · You have thoughts of harming yourself, your baby, or another person. ? · You passed out (lost consciousness). ?Call your doctor now or seek immediate medical care if: 
? · Your vaginal bleeding seems to be getting heavier. ? · You are dizzy or lightheaded, or you feel like you may faint. ? · You have a fever. ? Watch closely for changes in your health, and be sure to contact your doctor if: 
? · You have new or worse vaginal discharge. ? · You feel sad or depressed. ? · You are having problems with your breasts or breastfeeding. Where can you learn more? Go to http://colemanHubbubkalia.info/. Enter W912 in the search box to learn more about \"Postpartum: Care Instructions. \" Current as of: March 16, 2017 Content Version: 11.4 © 0137-2266 [employer]. Care instructions adapted under license by [employer] (which disclaims liability or warranty for this information). If you have questions about a medical condition or this instruction, always ask your healthcare professional. [place] [address] any warranty or liability for your use of this information. Introducing [facility]! Dom Astorga introduces IMNEXT patient portal. Now you can access parts of your medical record, email your doctor's office, and request medication refills online. 1. In your internet browser, go to [email or web address] 2. Click on the First Time User? Click Here link in the Sign In box. You will see the New Member Sign Up page. 3. Enter your [employer] Access Code exactly as it appears below. You will not need to use this code after youve completed the sign-up process. If you do not sign up before the expiration date, you must request a new code. · [employer] Access Code: [email or web address] Expires: 5/4/2018 10:16 AM 
 
4. Enter the last four digits of your Social Security Number (xxxx) and Date of Birth (mm/dd/yyyy) as indicated and click Submit. You will be taken to the next sign-up page. 5. Create a [employer] ID. This will be your [employer] login ID and cannot be changed, so think of one that is secure and easy to remember. 6. Create a [employer] password. You can change your password at any time. 7. Enter your Password Reset Question and Answer. This can be used at a later time if you forget your password. 8. Enter your e-mail address. You will receive e-mail notification when new information is available in [address] 9. Click Sign Up. You can now view and download portions of your medical record. 10. Click the Download Summary menu link to download a portable copy of your medical information. If you have questions, please visit the Frequently Asked Questions section of the [employer] website. Remember, [employer] is NOT to be used for urgent needs. For medical emergencies, dial 911. Now available from your iPhone and Android! Providers Seen During Your Hospitalization Provider Specialty Primary office phone Ayad Loza MD Obstetrics & Gynecology [phone] Immunizations Administered for This Admission Name Date Rho(D) Immune Globulin - IM 2/2/2018 Tdap  Deferred () Your Primary Care Physician (PCP) Primary Care Physician Office Phone Office Fax UNKNOWN, PROVIDER ** None ** ** None ** You are allergic to the following Allergen Reactions Amoxicillin Other (comments) \"stomach pain\" Recent Documentation Height Weight Breastfeeding? BMI OB Status Smoking Status 1.676 m 83.9 kg Unknown 29.86 kg/m2 Recent pregnancy Never Smoker Emergency Contacts Name Discharge Info Relation Home Work Mobile [address] [employer] [3] Spouse [3] [phone] Vincent Salcedo  Mother [14] [phone] Patient Belongings The following personal items are in your possession at time of discharge: 
  Dental Appliances: None  Visual Aid: None      Home Medications: None   Jewelry: Ring  Clothing: At bedside    Other Valuables: Cell Phone Please provide this summary of care documentation to your next provider. Signatures-by signing, you are acknowledging that this After Visit Summary has been reviewed with you and you have received a copy. Patient Signature:  ____________________________________________________________ Date:  ____________________________________________________________  
  
Sonal Medico Provider Signature:  ____________________________________________________________ Date:  ____________________________________________________________

## 2018-02-01 NOTE — PROGRESS NOTES
Pharmacy Automatic Renal Dosing Protocol - Antimicrobials    Indication for Antimicrobials: Group B strep  prophylaxis     Current Regimen of Each Antimicrobial:  Cefazolin 2 gm IV every 4 hours (Started 18; Day #1)    Significant Cultures:   Group B strep positive on outpatient screen at OB/GYN office    Paralysis, amputations, malnutrition: N/A    Labs:  Recent Labs      18   0625   WBC  6.0     Temp (24hrs), Av.5 °F (36.9 °C), Min:98 °F (36.7 °C), Max:98.9 °F (37.2 °C)    Creatinine Clearance (mL/min) or Dialysis: Greater than 90 mL/min    No results found for: PCT    Impression/Plan:   - For group B strep  prophylaxis, it is recommended to dose the cefazolin every 8 hours. Spoke with Dr Tabitha Myers, and he agrees with changing the cefazolin to every 8 hour dosing. Dose adjustments have been implemented. Pharmacy will follow daily and adjust medications as appropriate for renal function and/or serum levels.     Thank you,  Pam Norman, PHARMD

## 2018-02-01 NOTE — ANESTHESIA PREPROCEDURE EVALUATION
Anesthetic History   No history of anesthetic complications            Review of Systems / Medical History  Patient summary reviewed, nursing notes reviewed and pertinent labs reviewed    Pulmonary  Within defined limits                 Neuro/Psych   Within defined limits      Psychiatric history     Cardiovascular  Within defined limits                Exercise tolerance: >4 METS     GI/Hepatic/Renal  Within defined limits              Endo/Other  Within defined limits           Other Findings              Physical Exam    Airway  Mallampati: II  TM Distance: > 6 cm  Neck ROM: normal range of motion   Mouth opening: Normal     Cardiovascular    Rhythm: regular  Rate: normal         Dental  No notable dental hx       Pulmonary  Breath sounds clear to auscultation               Abdominal  GI exam deferred       Other Findings            Anesthetic Plan    ASA: 2  Anesthesia type: epidural      Post-op pain plan if not by surgeon: indwelling epidural catheter      Anesthetic plan and risks discussed with: Patient and Spouse

## 2018-02-02 PROCEDURE — 86900 BLOOD TYPING SEROLOGIC ABO: CPT | Performed by: SPECIALIST

## 2018-02-02 PROCEDURE — 74011250636 HC RX REV CODE- 250/636: Performed by: SPECIALIST

## 2018-02-02 PROCEDURE — 65410000002 HC RM PRIVATE OB

## 2018-02-02 PROCEDURE — 85461 HEMOGLOBIN FETAL: CPT | Performed by: SPECIALIST

## 2018-02-02 PROCEDURE — 74011250637 HC RX REV CODE- 250/637: Performed by: SPECIALIST

## 2018-02-02 PROCEDURE — 36415 COLL VENOUS BLD VENIPUNCTURE: CPT | Performed by: SPECIALIST

## 2018-02-02 RX ADMIN — HYDROCODONE BITARTRATE AND ACETAMINOPHEN 1 TABLET: 5; 325 TABLET ORAL at 15:43

## 2018-02-02 RX ADMIN — IBUPROFEN 800 MG: 400 TABLET, FILM COATED ORAL at 06:09

## 2018-02-02 RX ADMIN — HUMAN RHO(D) IMMUNE GLOBULIN 0.3 MG: 300 INJECTION, SOLUTION INTRAMUSCULAR at 08:49

## 2018-02-02 RX ADMIN — ACETAMINOPHEN 650 MG: 325 TABLET, FILM COATED ORAL at 10:25

## 2018-02-02 RX ADMIN — IBUPROFEN 800 MG: 400 TABLET, FILM COATED ORAL at 13:28

## 2018-02-02 RX ADMIN — IBUPROFEN 800 MG: 400 TABLET, FILM COATED ORAL at 21:34

## 2018-02-02 NOTE — PROGRESS NOTES
Bedside shift change report given to MANDO Kaye RN (oncoming nurse) by [employer] (offgoing nurse). Report included the following information SBAR, Intake/Output, MAR and Recent Results.

## 2018-02-02 NOTE — PROGRESS NOTES
Up to restroom. Steady gait. Voided 300 cc of blood tinged urine. Pt. Showered and transferred to room #3305.

## 2018-02-02 NOTE — PROGRESS NOTES
TRANSFER - OUT REPORT:    Verbal report given to Ghislaine Hunt RN(name) on [employer]  being transferred to MIU(unit) for routine progression of care       Report consisted of patients Situation, Background, Assessment and   Recommendations(SBAR). Information from the following report(s) SBAR, Kardex, Intake/Output, MAR and Recent Results was reviewed with the receiving nurse. Lines:   Peripheral IV 02/01/18 Left Hand (Active)        Opportunity for questions and clarification was provided.

## 2018-02-02 NOTE — PROGRESS NOTES
Post-Partum Day Number 1 Progress Note    Patient doing well post-partum without significant complaint. Voiding withour difficulty, normal lochia. Vitals:  Patient Vitals for the past 8 hrs:   BP Temp Pulse Resp   18 0300 108/60 98.3 °F (36.8 °C) 66 17     Temp (24hrs), Av.9 °F (37.2 °C), Min:98.3 °F (36.8 °C), Max:100.4 °F (38 °C)      Vital signs stable, afebrile. Exam:  Patient without distress. Abdomen soft, fundus firm at level of umbilicus, nontender               Perineum with normal lochia noted. Lower extremities are negative for swelling, cords or tenderness. Lab/Data Review:  BMP: No results found for: NA, K, CL, CO2, AGAP, GLU, BUN, CREA, GFRAA, GFRNA  CMP: No results found for: NA, K, CL, CO2, AGAP, GLU, BUN, CREA, GFRAA, GFRNA, CA, MG, PHOS, ALB, TBIL, TP, ALB, GLOB, AGRAT, SGOT, ALT, GPT  CBC: No results found for: WBC, HGB, HGBEXT, HCT, HCTEXT, PLT, PLTEXT, HGBEXT, HCTEXT, PLTEXT    Assessment and Plan:  Patient appears to be having uncomplicated post-partum course. Continue routine perineal care and maternal education. Plan discharge tomorrow if no problems occur.

## 2018-02-02 NOTE — PROGRESS NOTES
Bedside shift change report given to MARIA DE JESUS Malone Cap (oncoming nurse) by MANDO Camilo (offgoing nurse). Report given with SBAR.

## 2018-02-03 VITALS
OXYGEN SATURATION: 100 % | HEIGHT: 66 IN | DIASTOLIC BLOOD PRESSURE: 73 MMHG | RESPIRATION RATE: 16 BRPM | BODY MASS INDEX: 29.73 KG/M2 | SYSTOLIC BLOOD PRESSURE: 113 MMHG | HEART RATE: 62 BPM | WEIGHT: 185 LBS | TEMPERATURE: 98.1 F

## 2018-02-03 LAB
ABO + RH BLD: NORMAL
BLD PROD TYP BPU: NORMAL
BPU ID: NORMAL
FETAL SCREEN,FMHS: NORMAL
STATUS OF UNIT,%ST: NORMAL
UNIT DIVISION, %UDIV: 0
WEAK D AG RBC QL: NORMAL

## 2018-02-03 PROCEDURE — 90715 TDAP VACCINE 7 YRS/> IM: CPT | Performed by: OBSTETRICS & GYNECOLOGY

## 2018-02-03 PROCEDURE — 74011250636 HC RX REV CODE- 250/636: Performed by: OBSTETRICS & GYNECOLOGY

## 2018-02-03 PROCEDURE — 74011250637 HC RX REV CODE- 250/637: Performed by: SPECIALIST

## 2018-02-03 RX ORDER — HYDROCODONE BITARTRATE AND ACETAMINOPHEN 5; 325 MG/1; MG/1
1 TABLET ORAL
Qty: 20 TAB | Refills: 0 | Status: SHIPPED | OUTPATIENT
Start: 2018-02-03 | End: 2018-09-28

## 2018-02-03 RX ORDER — IBUPROFEN 800 MG/1
800 TABLET ORAL
Qty: 30 TAB | Refills: 0 | Status: SHIPPED | OUTPATIENT
Start: 2018-02-03 | End: 2018-09-28

## 2018-02-03 RX ADMIN — IBUPROFEN 800 MG: 400 TABLET, FILM COATED ORAL at 07:28

## 2018-02-03 NOTE — PROGRESS NOTES
Post-Partum Day Number 2 Progress Note    Luis Nephew     Information for the patient's :  Kyree Cummings [[id number]]   Vaginal, Spontaneous Delivery   Patient doing well without significant complaint. Voiding without difficulty, normal lochia. Vitals:    Visit Vitals    /64    Pulse 67    Temp 97.4 °F (36.3 °C)    Resp 16    Ht 5' 6\" (1.676 m)    Wt 83.9 kg (185 lb)    SpO2 100%    Breastfeeding Unknown    BMI 29.86 kg/m2     Temp (24hrs), Av.1 °F (36.7 °C), Min:97.4 °F (36.3 °C), Max:98.5 °F (36.9 °C)          Exam:         Patient without distress. Abdomen soft, fundus firm, nontender                Lower extremities are negative for swelling, cords or tenderness.     Labs:     Lab Results   Component Value Date/Time    WBC 6.0 2018 06:25 AM    WBC 7.7 2017 08:26 PM    WBC 5.1 2017 08:33 PM    WBC 5.5 2017 05:40 PM    WBC 6.6 2016 08:51 AM    WBC 15.1 2016 08:58 AM    WBC 17.2 2016 03:20 AM    WBC 10.7 2016 02:39 PM    WBC 6.2 2015 07:03 AM    WBC 4.9 2014 08:30 PM    WBC 3.8 2012 08:30 AM    WBC 5.4 2011 11:06 AM    WBC 7.7 2009 01:30 PM    HGB 11.2 2018 06:25 AM    HGB 13.2 2017 08:26 PM    HGB 14.1 2017 08:33 PM    HGB 13.2 2017 05:40 PM    HGB 10.7 2016 08:51 AM    HGB 10.8 2016 08:58 AM    HGB 11.9 2016 03:20 AM    HGB 12.6 2016 02:39 PM    HGB 11.1 2015 07:03 AM    HGB 12.5 2014 08:30 PM    HGB 12.7 2012 08:30 AM    HGB 11.8 2011 11:06 AM    HGB 12.7 2009 01:30 PM    HCT 32.8 2018 06:25 AM    HCT 38.3 2017 08:26 PM    HCT 39.9 2017 08:33 PM    HCT 39.6 2017 05:40 PM    HCT 31.9 2016 08:51 AM    HCT 32.5 2016 08:58 AM    HCT 37.0 2016 03:20 AM    HCT 38.3 2016 02:39 PM    HCT 32.5 2015 07:03 AM    HCT 37.0 2014 08:30 PM    HCT 37.3 06/04/2012 08:30 AM    HCT 36.2 11/23/2011 11:06 AM    HCT 36.0 09/23/2009 01:30 PM    PLATELET 900 58/98/8286 06:25 AM    PLATELET 795 67/13/1774 08:26 PM    PLATELET 106 33/71/3790 08:33 PM    PLATELET 067 66/29/5843 05:40 PM    PLATELET 807 97/07/5847 08:51 AM    PLATELET 937 41/84/9969 08:58 AM    PLATELET 349 41/12/3827 03:20 AM    PLATELET 264 32/82/8641 02:39 PM    PLATELET 271 00/30/1026 07:03 AM    PLATELET 568 56/15/8673 08:30 PM    PLATELET 880 91/87/1695 08:30 AM    PLATELET 012 54/32/3813 11:06 AM    PLATELET 347 22/72/6804 01:30 PM       No results found for this or any previous visit (from the past 24 hour(s)). Assessment: Doing well, post partum day 2      Plan:   1. Discharge home today  2. Follow up in office in 6 weeks with Lyndsey Brandon MD  3. Post partum activity advised, diet as tolerated  4.  Discharge Medications: ibuprofen, percocet and medications prior to admission

## 2018-02-03 NOTE — ROUTINE PROCESS
Bedside shift change report given to  MARIN Edmond RN by Na Brown. Ant FERRERA. Report included the following information SBAR.

## 2018-02-03 NOTE — DISCHARGE INSTRUCTIONS
Postpartum: Care Instructions  Your Care Instructions    After childbirth (postpartum period), your body goes through many changes. Some of these changes happen over several weeks. In the hours after delivery, your body will begin to recover from childbirth while it prepares to breastfeed your . You may feel emotional during this time. Your hormones can shift your mood without warning for no clear reason. In the first couple of weeks after childbirth, many women have emotions that change from happy to sad. You may find it hard to sleep. You may cry a lot. This is called the \"baby blues. \" These overwhelming emotions often go away within a couple of days or weeks. But it's important to discuss your feelings with your doctor. It is easy to get too tired and overwhelmed during the first weeks after childbirth. Don't try to do too much. Get rest whenever you can, accept help from others, and eat well and drink plenty of fluids. About 4 to 6 weeks after your baby's birth, you will have a follow-up visit with your doctor. This visit is your time to talk to your doctor about anything you are concerned or curious about. Follow-up care is a key part of your treatment and safety. Be sure to make and go to all appointments, and call your doctor if you are having problems. It's also a good idea to know your test results and keep a list of the medicines you take. How can you care for yourself at home? · Sleep or rest when your baby sleeps. · Get help with household chores from family or friends, if you can. Do not try to do it all yourself. · If you have hemorrhoids or swelling or pain around the opening of your vagina, try using cold and heat. You can put ice or a cold pack on the area for 10 to 20 minutes at a time. Put a thin cloth between the ice and your skin. Also try sitting in a few inches of warm water (sitz bath) 3 times a day and after bowel movements.   · Take pain medicines exactly as directed. ¨ If the doctor gave you a prescription medicine for pain, take it as prescribed. ¨ If you are not taking a prescription pain medicine, ask your doctor if you can take an over-the-counter medicine. · Eat more fiber to avoid constipation. Include foods such as whole-grain breads and cereals, raw vegetables, raw and dried fruits, and beans. · Drink plenty of fluids, enough so that your urine is light yellow or clear like water. If you have kidney, heart, or liver disease and have to limit fluids, talk with your doctor before you increase the amount of fluids you drink. · Do not rinse inside your vagina with fluids (douche). · If you have stitches, keep the area clean by pouring or spraying warm water over the area outside your vagina and anus after you use the toilet. · Keep a list of questions to bring to your postpartum visit. Your questions might be about:  ¨ Changes in your breasts, such as lumps or soreness. ¨ When to expect your menstrual period to start again. ¨ What form of birth control is best for you. ¨ Weight you have put on during the pregnancy. ¨ Exercise options. ¨ What foods and drinks are best for you, especially if you are breastfeeding. ¨ Problems you might be having with breastfeeding. ¨ When you can have sex. Some women may want to talk about lubricants for the vagina. ¨ Any feelings of sadness or restlessness that you are having. When should you call for help? Call 911 anytime you think you may need emergency care. For example, call if:  ? · You have thoughts of harming yourself, your baby, or another person. ? · You passed out (lost consciousness). ?Call your doctor now or seek immediate medical care if:  ? · Your vaginal bleeding seems to be getting heavier. ? · You are dizzy or lightheaded, or you feel like you may faint. ? · You have a fever. ? Watch closely for changes in your health, and be sure to contact your doctor if:  ? · You have new or worse vaginal discharge. ? · You feel sad or depressed. ? · You are having problems with your breasts or breastfeeding. Where can you learn more? Go to http://coleman-kalia.info/. Enter R213 in the search box to learn more about \"Postpartum: Care Instructions. \"  Current as of: March 16, 2017  Content Version: 11.4  © 7831-4860 [employer]. Care instructions adapted under license by [employer] (which disclaims liability or warranty for this information). If you have questions about a medical condition or this instruction, always ask your healthcare professional. [place] [address] any warranty or liability for your use of this information.

## 2018-02-03 NOTE — PROGRESS NOTES
Discharge instructions and prescriptions given with understanding voiced by pt.     1200: Discharged via w/c, with infant in infant seat. Taken to vehicle by JORGE ALBERTO Squires.

## 2018-02-20 NOTE — DISCHARGE SUMMARY
Obstetrical Discharge Summary     Name: Pb Moreira MRN: [id number]  SSN: [id number]    YOB: 1985  Age: 33 y.o. Sex: female      Allergies: Amoxicillin    Admit Date: 2018    Discharge Date: 2018     Admitting Physician: Tay Mahmood MD     Attending Physician:  Cheli att. providers found     * Admission Diagnoses: Induction  Pregnancy    * Discharge Diagnoses:   Information for the patient's :  Andrea Macias [[id number]]   Delivery of a 3.65 kg female infant via Vaginal, Spontaneous Delivery on 2018 at 4:58 PM  by . Apgars were 7 and 9. Additional Diagnoses:   Hospital Problems as of 2/3/2018  Date Reviewed: 2018          Codes Class Noted - Resolved POA    Pregnancy ICD-10-CM: Z34.90  ICD-9-CM: V22.2  2015 - Present Unknown             Lab Results   Component Value Date/Time    ABO/Rh(D) A NEGATIVE 2018 02:58 AM    Rubella, External immune 2017    GrBStrep, External positive 2018    ABO,Rh A negative 2014      Immunization History   Administered Date(s) Administered    Rho(D) Immune Globulin - IM 2014, 07/10/2015, 2018    Tdap 2015       * Procedures:   * No surgery found *           * Discharge Condition: stable    * Hospital Course: Normal hospital course following the delivery. * Disposition: Home    Discharge Medications:   Discharge Medication List as of 2/3/2018 10:54 AM      START taking these medications    Details   HYDROcodone-acetaminophen (NORCO) 5-325 mg per tablet Take 1 Tab by mouth every four (4) hours as needed.  Max Daily Amount: 6 Tabs., Print, Disp-20 Tab, R-0      ibuprofen (MOTRIN) 800 mg tablet Take 1 Tab by mouth every eight (8) hours as needed for Pain., Print, Disp-30 Tab, R-0         CONTINUE these medications which have NOT CHANGED    Details   PNV No12-Iron-FA-DSS-OM-3 29 mg iron-1 mg -50 mg CPKD Take  by mouth., Historical Med      ondansetron hcl (ZOFRAN, AS HYDROCHLORIDE,) 4 mg tablet Take 1 Tab by mouth every eight (8) hours as needed for Nausea. , Print, Disp-20 Tab, R-0      L. acidoph & paracasei- S therm- Bifido (KIMBERLY-Q) 8 billion cell cap cap Take 1 Cap by mouth daily. OK to substitute over-the-counter probiotic for 30 days (Culturelle, Florastor, etc.), Print, Disp-30 Cap, R-0      sertraline (ZOLOFT) 50 mg tablet Take 50 mg by mouth daily. , Historical Med      norethindrone-ethinyl estradiol (JUNEL FE 1/20) 1 mg-20 mcg (21)/75 mg (7) tab Take 1 Tab by mouth daily. , Historical Med             * Follow-up Care/Patient Instructions:   Activity: Activity as tolerated  Diet: Regular Diet  Wound Care: None needed    Follow-up Information     Follow up With Details Comments Contact Info    Provider Unknown   Patient not available to ask             Signed By:  Jaden Ellington MD     February 20, 2018

## 2018-06-07 ENCOUNTER — HOSPITAL ENCOUNTER (EMERGENCY)
Age: 33
Discharge: HOME | End: 2018-06-07
Attending: EMERGENCY MEDICINE
Payer: COMMERCIAL

## 2018-06-07 ENCOUNTER — APPOINTMENT (OUTPATIENT)
Dept: CT IMAGING | Age: 33
End: 2018-06-07
Attending: NURSE PRACTITIONER
Payer: COMMERCIAL

## 2018-06-07 VITALS
TEMPERATURE: 98.1 F | RESPIRATION RATE: 18 BRPM | BODY MASS INDEX: 27.58 KG/M2 | OXYGEN SATURATION: 100 % | HEIGHT: 66 IN | HEART RATE: 75 BPM | DIASTOLIC BLOOD PRESSURE: 80 MMHG | WEIGHT: 171.6 LBS | SYSTOLIC BLOOD PRESSURE: 122 MMHG

## 2018-06-07 DIAGNOSIS — R10.9 FLANK PAIN: Primary | ICD-10-CM

## 2018-06-07 LAB
ALBUMIN SERPL-MCNC: 4.1 G/DL (ref 3.5–5)
ALBUMIN/GLOB SERPL: 1.1 {RATIO} (ref 1.1–2.2)
ALP SERPL-CCNC: 95 U/L (ref 45–117)
ALT SERPL-CCNC: 22 U/L (ref 12–78)
ANION GAP SERPL CALC-SCNC: 8 MMOL/L (ref 5–15)
APPEARANCE UR: ABNORMAL
AST SERPL-CCNC: 18 U/L (ref 15–37)
BACTERIA URNS QL MICRO: NEGATIVE /HPF
BASOPHILS # BLD: 0 K/UL (ref 0–0.1)
BASOPHILS NFR BLD: 1 % (ref 0–1)
BILIRUB SERPL-MCNC: 0.4 MG/DL (ref 0.2–1)
BILIRUB UR QL: NEGATIVE
BUN SERPL-MCNC: 14 MG/DL (ref 6–20)
BUN/CREAT SERPL: 16 (ref 12–20)
CALCIUM SERPL-MCNC: 9.1 MG/DL (ref 8.5–10.1)
CHLORIDE SERPL-SCNC: 106 MMOL/L (ref 97–108)
CO2 SERPL-SCNC: 29 MMOL/L (ref 21–32)
COLOR UR: ABNORMAL
CREAT SERPL-MCNC: 0.88 MG/DL (ref 0.55–1.02)
DIFFERENTIAL METHOD BLD: NORMAL
EOSINOPHIL # BLD: 0.1 K/UL (ref 0–0.4)
EOSINOPHIL NFR BLD: 1 % (ref 0–7)
EPITH CASTS URNS QL MICRO: ABNORMAL /LPF
ERYTHROCYTE [DISTWIDTH] IN BLOOD BY AUTOMATED COUNT: 13.5 % (ref 11.5–14.5)
GLOBULIN SER CALC-MCNC: 3.7 G/DL (ref 2–4)
GLUCOSE SERPL-MCNC: 77 MG/DL (ref 65–100)
GLUCOSE UR STRIP.AUTO-MCNC: NEGATIVE MG/DL
HCT VFR BLD AUTO: 36.5 % (ref 35–47)
HGB BLD-MCNC: 12.3 G/DL (ref 11.5–16)
HGB UR QL STRIP: ABNORMAL
IMM GRANULOCYTES # BLD: 0 K/UL (ref 0–0.04)
IMM GRANULOCYTES NFR BLD AUTO: 0 % (ref 0–0.5)
KETONES UR QL STRIP.AUTO: ABNORMAL MG/DL
LEUKOCYTE ESTERASE UR QL STRIP.AUTO: ABNORMAL
LYMPHOCYTES # BLD: 2.1 K/UL (ref 0.8–3.5)
LYMPHOCYTES NFR BLD: 48 % (ref 12–49)
MCH RBC QN AUTO: 31.1 PG (ref 26–34)
MCHC RBC AUTO-ENTMCNC: 33.7 G/DL (ref 30–36.5)
MCV RBC AUTO: 92.2 FL (ref 80–99)
MONOCYTES # BLD: 0.3 K/UL (ref 0–1)
MONOCYTES NFR BLD: 7 % (ref 5–13)
NEUTS SEG # BLD: 1.9 K/UL (ref 1.8–8)
NEUTS SEG NFR BLD: 44 % (ref 32–75)
NITRITE UR QL STRIP.AUTO: NEGATIVE
NRBC # BLD: 0 K/UL (ref 0–0.01)
NRBC BLD-RTO: 0 PER 100 WBC
PH UR STRIP: 5.5 [PH] (ref 5–8)
PLATELET # BLD AUTO: 186 K/UL (ref 150–400)
PMV BLD AUTO: 12 FL (ref 8.9–12.9)
POTASSIUM SERPL-SCNC: 4.3 MMOL/L (ref 3.5–5.1)
PROT SERPL-MCNC: 7.8 G/DL (ref 6.4–8.2)
PROT UR STRIP-MCNC: 30 MG/DL
RBC # BLD AUTO: 3.96 M/UL (ref 3.8–5.2)
RBC #/AREA URNS HPF: >100 /HPF (ref 0–5)
SODIUM SERPL-SCNC: 143 MMOL/L (ref 136–145)
SP GR UR REFRACTOMETRY: 1.02 (ref 1–1.03)
UR CULT HOLD, URHOLD: NORMAL
UROBILINOGEN UR QL STRIP.AUTO: 0.2 EU/DL (ref 0.2–1)
WBC # BLD AUTO: 4.4 K/UL (ref 3.6–11)
WBC URNS QL MICRO: ABNORMAL /HPF (ref 0–4)

## 2018-06-07 PROCEDURE — 36415 COLL VENOUS BLD VENIPUNCTURE: CPT | Performed by: NURSE PRACTITIONER

## 2018-06-07 PROCEDURE — 81001 URINALYSIS AUTO W/SCOPE: CPT | Performed by: NURSE PRACTITIONER

## 2018-06-07 PROCEDURE — 85025 COMPLETE CBC W/AUTO DIFF WBC: CPT | Performed by: NURSE PRACTITIONER

## 2018-06-07 PROCEDURE — 99283 EMERGENCY DEPT VISIT LOW MDM: CPT

## 2018-06-07 PROCEDURE — 80053 COMPREHEN METABOLIC PANEL: CPT | Performed by: NURSE PRACTITIONER

## 2018-06-07 RX ORDER — OXYCODONE AND ACETAMINOPHEN 5; 325 MG/1; MG/1
1 TABLET ORAL
Qty: 12 TAB | Refills: 0 | Status: SHIPPED | OUTPATIENT
Start: 2018-06-07 | End: 2018-09-28

## 2018-06-07 RX ORDER — SODIUM CHLORIDE 0.9 % (FLUSH) 0.9 %
10 SYRINGE (ML) INJECTION
Status: DISCONTINUED | OUTPATIENT
Start: 2018-06-07 | End: 2018-06-07

## 2018-06-07 NOTE — ED NOTES
7:09 PM  I have evaluated the patient as the Provider in Triage. I have reviewed Her vital signs and the triage nurse assessment. I have talked with the patient and any available family and advised that I am the provider in triage and have ordered the appropriate study to initiate their work up based on the clinical presentation during my assessment. I have advised that the patient will be accommodated in the Main ED as soon as possible. I have also requested to contact the triage nurse or myself immediately if the patient experiences any changes in their condition during this brief waiting period. Left side/flank pain x 1 week. Some urinary burning. No n/v, fevers. chills. Feels like a kidney infection she had 2 years ago.      Labs & imaging ordered  Sarah Alcantar NP

## 2018-06-08 NOTE — ED NOTES
MD reviewed discharge instructions with the patient. The patient verbalized understanding. Patient ambulated out of ED by herself. No complaints or concerns noted.

## 2018-06-08 NOTE — DISCHARGE INSTRUCTIONS
Abdominal Pain: Care Instructions  Your Care Instructions    Abdominal pain has many possible causes. Some aren't serious and get better on their own in a few days. Others need more testing and treatment. If your pain continues or gets worse, you need to be rechecked and may need more tests to find out what is wrong. You may need surgery to correct the problem. Don't ignore new symptoms, such as fever, nausea and vomiting, urination problems, pain that gets worse, and dizziness. These may be signs of a more serious problem. Your doctor may have recommended a follow-up visit in the next 8 to 12 hours. If you are not getting better, you may need more tests or treatment. The doctor has checked you carefully, but problems can develop later. If you notice any problems or new symptoms, get medical treatment right away. Follow-up care is a key part of your treatment and safety. Be sure to make and go to all appointments, and call your doctor if you are having problems. It's also a good idea to know your test results and keep a list of the medicines you take. How can you care for yourself at home? · Rest until you feel better. · To prevent dehydration, drink plenty of fluids, enough so that your urine is light yellow or clear like water. Choose water and other caffeine-free clear liquids until you feel better. If you have kidney, heart, or liver disease and have to limit fluids, talk with your doctor before you increase the amount of fluids you drink. · If your stomach is upset, eat mild foods, such as rice, dry toast or crackers, bananas, and applesauce. Try eating several small meals instead of two or three large ones. · Wait until 48 hours after all symptoms have gone away before you have spicy foods, alcohol, and drinks that contain caffeine. · Do not eat foods that are high in fat. · Avoid anti-inflammatory medicines such as aspirin, ibuprofen (Advil, Motrin), and naproxen (Aleve).  These can cause stomach upset. Talk to your doctor if you take daily aspirin for another health problem. When should you call for help? Call 911 anytime you think you may need emergency care. For example, call if:  ? · You passed out (lost consciousness). ? · You pass maroon or very bloody stools. ? · You vomit blood or what looks like coffee grounds. ? · You have new, severe belly pain. ?Call your doctor now or seek immediate medical care if:  ? · Your pain gets worse, especially if it becomes focused in one area of your belly. ? · You have a new or higher fever. ? · Your stools are black and look like tar, or they have streaks of blood. ? · You have unexpected vaginal bleeding. ? · You have symptoms of a urinary tract infection. These may include:  ¨ Pain when you urinate. ¨ Urinating more often than usual.  ¨ Blood in your urine. ? · You are dizzy or lightheaded, or you feel like you may faint. ? Watch closely for changes in your health, and be sure to contact your doctor if:  ? · You are not getting better after 1 day (24 hours). Where can you learn more? Go to http://coleman-kalia.info/. Enter O870 in the search box to learn more about \"Abdominal Pain: Care Instructions. \"  Current as of: March 20, 2017  Content Version: 11.4  © 4605-3362 [employer]. Care instructions adapted under license by [employer] (which disclaims liability or warranty for this information). If you have questions about a medical condition or this instruction, always ask your healthcare professional. [place] [address] any warranty or liability for your use of this information.

## 2018-06-08 NOTE — ED PROVIDER NOTES
HPI Comments: 33 y.o. female with past medical history significant for psychiatric disorder who presents from home with chief complaint of flank pain. Pt reports constant 7/10 L flank pain for 5 days accompanied by mild dysuria. She states her pain is worsened by bending over and lifting her young kids. When asked about hx of abdominal surgery, Pt reports L ovarian cyst surgery and tubal ligation. She is currently on her MP. Pt denies urinary frequency, fever, and chills. There are no other acute medical concerns at this time. Note written by Adeline Diggs, as dictated by Reyes Sierras, MD 8:02 PM     The history is provided by the patient. Past Medical History:   Diagnosis Date    Psychiatric disorder        Past Surgical History:   Procedure Laterality Date    HX GYN      left ovarian cyst removal         Family History:   Problem Relation Age of Onset    Diabetes Maternal Grandmother     Hypertension Maternal Grandmother        Social History     Social History    Marital status:      Spouse name: N/A    Number of children: N/A    Years of education: N/A     Occupational History    Not on file. Social History Main Topics    Smoking status: Never Smoker    Smokeless tobacco: Never Used    Alcohol use No    Drug use: No    Sexual activity: Yes     Partners: Male     Other Topics Concern    Not on file     Social History Narrative         ALLERGIES: Amoxicillin    Review of Systems   Constitutional: Negative for appetite change, chills and fever. HENT: Negative for rhinorrhea, sore throat and trouble swallowing. Eyes: Negative for photophobia. Respiratory: Negative for cough and shortness of breath. Cardiovascular: Negative for chest pain and palpitations. Gastrointestinal: Negative for abdominal pain, nausea and vomiting. Genitourinary: Positive for dysuria and flank pain. Negative for frequency and hematuria. Musculoskeletal: Negative for arthralgias. Neurological: Negative for dizziness, syncope and weakness. Psychiatric/Behavioral: Negative for behavioral problems. The patient is not nervous/anxious. All other systems reviewed and are negative. Vitals:    06/07/18 1912   BP: 126/83   Pulse: 83   Resp: 16   Temp: 98.4 °F (36.9 °C)   SpO2: 100%   Weight: 77.8 kg (171 lb 9.6 oz)   Height: 5' 6\" (1.676 m)            Physical Exam   Constitutional: She appears well-developed and well-nourished. HENT:   Head: Normocephalic and atraumatic. Mouth/Throat: Oropharynx is clear and moist.   Eyes: EOM are normal. Pupils are equal, round, and reactive to light. Neck: Normal range of motion. Neck supple. Cardiovascular: Normal rate, regular rhythm, normal heart sounds and intact distal pulses. Exam reveals no gallop and no friction rub. No murmur heard. Pulmonary/Chest: Effort normal. No respiratory distress. She has no wheezes. She has no rales. Abdominal: Soft. There is no tenderness. There is CVA tenderness (L). There is no rebound. Musculoskeletal: Normal range of motion. She exhibits no tenderness. Neurological: She is alert. No cranial nerve deficit. Motor; symmetric   Skin: No erythema. Psychiatric: She has a normal mood and affect. Her behavior is normal.   Nursing note and vitals reviewed.   Note written by Adeline Grant, as dictated by Ale Chaidez MD 8:02 PM    [facility]      ED Course       Procedures

## 2018-09-28 ENCOUNTER — HOSPITAL ENCOUNTER (EMERGENCY)
Age: 33
Discharge: HOME | End: 2018-09-28
Attending: EMERGENCY MEDICINE | Admitting: EMERGENCY MEDICINE
Payer: SELF-PAY

## 2018-09-28 ENCOUNTER — APPOINTMENT (OUTPATIENT)
Dept: GENERAL RADIOLOGY | Age: 33
End: 2018-09-28
Attending: EMERGENCY MEDICINE
Payer: SELF-PAY

## 2018-09-28 VITALS
RESPIRATION RATE: 16 BRPM | TEMPERATURE: 98.2 F | OXYGEN SATURATION: 97 % | SYSTOLIC BLOOD PRESSURE: 136 MMHG | HEIGHT: 66 IN | BODY MASS INDEX: 27 KG/M2 | WEIGHT: 168 LBS | DIASTOLIC BLOOD PRESSURE: 91 MMHG | HEART RATE: 85 BPM

## 2018-09-28 DIAGNOSIS — M54.2 NECK PAIN: ICD-10-CM

## 2018-09-28 DIAGNOSIS — M54.9 ACUTE LEFT-SIDED BACK PAIN, UNSPECIFIED BACK LOCATION: ICD-10-CM
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
## 2018-09-28 LAB — HCG UR QL: NEGATIVE

## 2018-09-28 PROCEDURE — 74011250637 HC RX REV CODE- 250/637: Performed by: EMERGENCY MEDICINE

## 2018-09-28 PROCEDURE — 81025 URINE PREGNANCY TEST: CPT

## 2018-09-28 PROCEDURE — 99283 EMERGENCY DEPT VISIT LOW MDM: CPT

## 2018-09-28 PROCEDURE — 72100 X-RAY EXAM L-S SPINE 2/3 VWS: CPT

## 2018-09-28 PROCEDURE — 72052 X-RAY EXAM NECK SPINE 6/>VWS: CPT

## 2018-09-28 PROCEDURE — 71046 X-RAY EXAM CHEST 2 VIEWS: CPT

## 2018-09-28 RX ORDER — IBUPROFEN 600 MG/1
600 TABLET ORAL
Qty: 20 TAB | Refills: 0 | Status: SHIPPED | OUTPATIENT
Start: 2018-09-28 | End: 2021-05-18 | Stop reason: CLARIF

## 2018-09-28 RX ORDER — IBUPROFEN 400 MG/1
800 TABLET ORAL
Status: COMPLETED | OUTPATIENT
Start: 2018-09-28 | End: 2018-09-28

## 2018-09-28 RX ORDER — TRAMADOL HYDROCHLORIDE 50 MG/1
50 TABLET ORAL
Qty: 8 TAB | Refills: 0 | Status: SHIPPED | OUTPATIENT
Start: 2018-09-28 | End: 2021-05-18 | Stop reason: CLARIF

## 2018-09-28 RX ADMIN — IBUPROFEN 800 MG: 400 TABLET ORAL at 12:20
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
## 2018-09-28 NOTE — Clinical Note
Ibuprofen 600m pill every 6 hours as needed for pain. Expect increasing soreness over next 2 days. No heavy lifting, no strenuous activity. Rest but remain active. Return to ER for any nausea, vomiting, chest pain, shortness of breath, numbness/tin gling, weakness, headache. Please follow-up with primary care doctor for reevaluation.
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
## 2019-09-26 NOTE — ED TRIAGE NOTES
Pt presents with left flank pain and dysuria x 1 week. Pt is approx 4-6 weeks pregnant. History of kidney stones. - - -

## 2020-02-10 ENCOUNTER — HOSPITAL ENCOUNTER (EMERGENCY)
Age: 35
Discharge: HOME | End: 2020-02-10
Attending: EMERGENCY MEDICINE
Payer: SELF-PAY

## 2020-02-10 VITALS
RESPIRATION RATE: 18 BRPM | HEART RATE: 82 BPM | BODY MASS INDEX: 24.11 KG/M2 | DIASTOLIC BLOOD PRESSURE: 80 MMHG | WEIGHT: 150 LBS | OXYGEN SATURATION: 100 % | TEMPERATURE: 98.2 F | HEIGHT: 66 IN | SYSTOLIC BLOOD PRESSURE: 141 MMHG

## 2020-02-10 DIAGNOSIS — N39.0 ACUTE UTI (URINARY TRACT INFECTION): Primary | ICD-10-CM

## 2020-02-10 LAB
APPEARANCE UR: CLEAR
BACTERIA URNS QL MICRO: ABNORMAL /HPF
BILIRUB UR QL: NEGATIVE
COLOR UR: ABNORMAL
EPITH CASTS URNS QL MICRO: ABNORMAL /LPF
GLUCOSE UR STRIP.AUTO-MCNC: NEGATIVE MG/DL
HGB UR QL STRIP: NEGATIVE
KETONES UR QL STRIP.AUTO: NEGATIVE MG/DL
LEUKOCYTE ESTERASE UR QL STRIP.AUTO: ABNORMAL
NITRITE UR QL STRIP.AUTO: NEGATIVE
PH UR STRIP: 6 [PH] (ref 5–8)
PROT UR STRIP-MCNC: NEGATIVE MG/DL
RBC #/AREA URNS HPF: ABNORMAL /HPF
SP GR UR REFRACTOMETRY: 1.03 (ref 1–1.03)
UA: UC IF INDICATED,UAUC: ABNORMAL
UROBILINOGEN UR QL STRIP.AUTO: 0.2 EU/DL (ref 0.2–1)
WBC URNS QL MICRO: ABNORMAL /HPF

## 2020-02-10 PROCEDURE — 87186 SC STD MICRODIL/AGAR DIL: CPT

## 2020-02-10 PROCEDURE — 81001 URINALYSIS AUTO W/SCOPE: CPT

## 2020-02-10 PROCEDURE — 99283 EMERGENCY DEPT VISIT LOW MDM: CPT

## 2020-02-10 PROCEDURE — 87077 CULTURE AEROBIC IDENTIFY: CPT

## 2020-02-10 PROCEDURE — 87086 URINE CULTURE/COLONY COUNT: CPT

## 2020-02-10 RX ORDER — CEPHALEXIN 500 MG/1
500 CAPSULE ORAL 2 TIMES DAILY
Qty: 10 CAP | Refills: 0 | Status: SHIPPED | OUTPATIENT
Start: 2020-02-10 | End: 2020-02-15

## 2020-02-10 RX ORDER — PHENAZOPYRIDINE HYDROCHLORIDE 200 MG/1
200 TABLET, FILM COATED ORAL 3 TIMES DAILY
Qty: 6 TAB | Refills: 0 | Status: SHIPPED | OUTPATIENT
Start: 2020-02-10 | End: 2020-02-12

## 2020-02-10 NOTE — DISCHARGE INSTRUCTIONS

## 2020-02-10 NOTE — ED PROVIDER NOTES
EMERGENCY DEPARTMENT HISTORY AND PHYSICAL EXAM      Date: 2/10/2020  Patient Name: Jamila Hays    History of Presenting Illness     Chief Complaint   Patient presents with    Urinary Pain     onset one week with pain with urination       History Provided By: Patient    HPI: Jamila Hays, 36 y.o. female with PMHx significant for psychiatric disorder. Patient presents to the emergency department UTI symptoms of frequency, dysuria that started couple days ago. She denies any pelvic pain, vaginal discharge, hematuria, flank pain, nausea or vomiting or any fever symptoms at this time. She does state that she was hospitalized 1 time due to sepsis with infected kidney stone in 2016 and this does not feel anywhere close to the pain she experienced at that time. Please note for examination and HPI were completed I did introduce myself as the physician assistant. Please note that this dictation was completed with [email or web address], the [employer] voice recognition software. Quite often unanticipated grammatical, syntax, homophones, and other interpretive errors are inadvertently transcribed by the computer software. Please disregard these errors. Please excuse any errors that have escaped final proofreading. For further clarification on any chart please contact myself. Thank you. There are no other complaints, changes, or physical findings at this time. PCP: Unknown, Provider    No current facility-administered medications on file prior to encounter. Current Outpatient Medications on File Prior to Encounter   Medication Sig Dispense Refill    ibuprofen (MOTRIN) 600 mg tablet Take 1 Tab by mouth every six (6) hours as needed for Pain. 20 Tab 0    traMADol (ULTRAM) 50 mg tablet Take 1 Tab by mouth every six (6) hours as needed for Pain. Max Daily Amount: 200 mg. 8 Tab 0    PNV No12-Iron-FA-DSS-OM-3 29 mg iron-1 mg -50 mg CPKD Take  by mouth.       ondansetron hcl (ZOFRAN, AS HYDROCHLORIDE,) 4 mg tablet Take 1 Tab by mouth every eight (8) hours as needed for Nausea. 20 Tab 0    L. acidoph & paracasei- S therm- Bifido (KIMBERLY-Q) 8 billion cell cap cap Take 1 Cap by mouth daily. OK to substitute over-the-counter probiotic for 30 days (Culturelle, Florastor, etc.) 30 Cap 0    sertraline (ZOLOFT) 50 mg tablet Take 50 mg by mouth daily.  norethindrone-ethinyl estradiol (JUNEL FE 1/20) 1 mg-20 mcg (21)/75 mg (7) tab Take 1 Tab by mouth daily. Past History     Past Medical History:  Past Medical History:   Diagnosis Date    Psychiatric disorder        Past Surgical History:  Past Surgical History:   Procedure Laterality Date    HX GYN      left ovarian cyst removal       Family History:  Family History   Problem Relation Age of Onset    Diabetes Maternal Grandmother     Hypertension Maternal Grandmother        Social History:  Social History     Tobacco Use    Smoking status: Never Smoker    Smokeless tobacco: Never Used   Substance Use Topics    Alcohol use: No    Drug use: No       Allergies: Allergies   Allergen Reactions    Amoxicillin Other (comments)     \"stomach pain\"    Hydrocodone Itching         Review of Systems   Review of Systems   All other systems reviewed and are negative. Physical Exam   Physical Exam  Vitals signs and nursing note reviewed. Constitutional:       Appearance: She is well-developed. HENT:      Head: Normocephalic and atraumatic. Eyes:      Conjunctiva/sclera: Conjunctivae normal.      Pupils: Pupils are equal, round, and reactive to light. Neck:      Musculoskeletal: Normal range of motion and neck supple. Thyroid: No thyromegaly. Cardiovascular:      Rate and Rhythm: Normal rate and regular rhythm. Heart sounds: Normal heart sounds. No murmur. Pulmonary:      Effort: Pulmonary effort is normal. No respiratory distress. Breath sounds: Normal breath sounds. No stridor. No wheezing.    Abdominal:      General: Bowel sounds are normal. Palpations: Abdomen is soft. Tenderness: There is no abdominal tenderness. There is no right CVA tenderness or left CVA tenderness. Musculoskeletal: Normal range of motion. General: No tenderness. Lymphadenopathy:      Cervical: No cervical adenopathy. Skin:     General: Skin is warm. Neurological:      Mental Status: She is alert and oriented to person, place, and time. Deep Tendon Reflexes: Reflexes are normal and symmetric. Psychiatric:         Judgment: Judgment normal.         Diagnostic Study Results     Labs -     Recent Results (from the past 12 hour(s))   URINALYSIS W/ REFLEX CULTURE    Collection Time: 02/10/20  2:18 PM   Result Value Ref Range    Color YELLOW/STRAW      Appearance CLEAR CLEAR      Specific gravity 1.029 1.003 - 1.030      pH (UA) 6.0 5.0 - 8.0      Protein NEGATIVE  NEG mg/dL    Glucose NEGATIVE  NEG mg/dL    Ketone NEGATIVE  NEG mg/dL    Bilirubin NEGATIVE  NEG      Blood NEGATIVE  NEG      Urobilinogen 0.2 0.2 - 1.0 EU/dL    Nitrites NEGATIVE  NEG      Leukocyte Esterase MODERATE (A) NEG      WBC 5-10 /hpf    RBC 0-5 /hpf    Epithelial cells MODERATE /lpf    Bacteria 2+ /hpf    UA:UC IF INDICATED URINE CULTURE ORDERED         Radiologic Studies -   No orders to display     CT Results  (Last 48 hours)    None        CXR Results  (Last 48 hours)    None            Medical Decision Making   I am the first provider for this patient. I reviewed the vital signs, available nursing notes, past medical history, past surgical history, family history and social history. Vital Signs-Reviewed the patient's vital signs. Patient Vitals for the past 12 hrs:   Temp Pulse Resp BP SpO2   02/10/20 1408 98.2 °F (36.8 °C) 82 18 141/80 100 %       Records Reviewed: Nursing Notes    Provider Notes (Medical Decision Making): At this time does appear that patient has a UTI noted on her urinalysis. At this time we will go ahead and treat with Keflex and Pyridium.   Patient has tolerated these medications previously. She does not have any nausea or vomiting or flank pain significant for potential pyelonephritis, hydronephrosis secondary to kidney stone. No other occult problems such as signs for TOA, appendicitis, colitis. She will follow-up on outpatient basis with primary specialist.    ED Course:   Initial assessment performed. The patients presenting problems have been discussed, and they are in agreement with the care plan formulated and outlined with them. I have encouraged them to ask questions as they arise throughout their visit. Critical Care Time: None    Disposition:  Dispo    PLAN:  1. Discharge Medication List as of 2/10/2020  3:16 PM      START taking these medications    Details   cephALEXin (KEFLEX) 500 mg capsule Take 1 Cap by mouth two (2) times a day for 5 days. , Print, Disp-10 Cap, R-0      phenazopyridine (PYRIDIUM) 200 mg tablet Take 1 Tab by mouth three (3) times daily for 2 days. , Print, Disp-6 Tab, R-0         CONTINUE these medications which have NOT CHANGED    Details   ibuprofen (MOTRIN) 600 mg tablet Take 1 Tab by mouth every six (6) hours as needed for Pain., Print, Disp-20 Tab, R-0      traMADol (ULTRAM) 50 mg tablet Take 1 Tab by mouth every six (6) hours as needed for Pain. Max Daily Amount: 200 mg., Print, Disp-8 Tab, R-0      PNV No12-Iron-FA-DSS-OM-3 29 mg iron-1 mg -50 mg CPKD Take  by mouth., Historical Med      ondansetron hcl (ZOFRAN, AS HYDROCHLORIDE,) 4 mg tablet Take 1 Tab by mouth every eight (8) hours as needed for Nausea. , Print, Disp-20 Tab, R-0      L. acidoph & paracasei- S therm- Bifido (KIMBERLY-Q) 8 billion cell cap cap Take 1 Cap by mouth daily. OK to substitute over-the-counter probiotic for 30 days (Culturelle, Florastor, etc.), Print, Disp-30 Cap, R-0      sertraline (ZOLOFT) 50 mg tablet Take 50 mg by mouth daily. , Historical Med      norethindrone-ethinyl estradiol (JUNEL FE 1/20) 1 mg-20 mcg (21)/75 mg (7) tab Take 1 Tab by mouth daily. , Historical Med           2. Follow-up Information     Follow up With Specialties Details Why [facility]    [address] [facility] [address]  [address] [facility]   [facility] [place]  [address]  [address] [facility]  [phone]        Return to ED if worse     Diagnosis     Clinical Impression:   1. Acute UTI (urinary tract infection)          Please note that this dictation was completed with [employer]ur, the computer voice recognition software. Quite often unanticipated grammatical, syntax, homophones, and other interpretive errors are inadvertently transcribed by the computer software. Please disregards these errors. Please excuse any errors that have escaped final proofreading. This note will not be viewable in [address]

## 2020-02-10 NOTE — LETTER
[address] 
[facility] EMERGENCY DEPT 
[address] [place] [address] [place] [address] [phone] Work/School Note Date: 2/10/2020 To Whom It May concern: 
 
Jamila Hays was seen and treated today in the emergency room by the following provider(s): 
Attending Provider: Verenice Ochoa MD 
Physician Assistant: Kalpesh Scott PA-C. Jamila Hays may return to school on 2/11/20. Sincerely, Bc Hernandez PA-C

## 2020-02-12 LAB
BACTERIA SPEC CULT: ABNORMAL
CC UR VC: ABNORMAL
SERVICE CMNT-IMP: ABNORMAL

## 2021-05-18 NOTE — PERIOP NOTES
[facility]  Preoperative Instructions        Surgery Date 5/27/2021          Time of Arrival 5:45am  Covid Testing 5/23/2021 between 7am-10am    1. On the day of your surgery, please report to the Surgical Services Registration Desk and sign in at your designated time. The Surgery Center is located to the right of the Emergency Room. 2. You must have someone with you to drive you home. You should not drive a car for 24 hours following surgery. Please make arrangements for a friend or family member to stay with you for the first 24 hours after your surgery. 3. Do not have anything to eat or drink (including water, gum, mints, coffee, juice) after midnight. ?This may not apply to medications prescribed by your physician. ?(Please note below the special instructions with medications to take the morning of your procedure.)    4. We recommend you do not drink any alcoholic beverages for 24 hours before and after your surgery. 5. Contact your surgeons office for instructions on the following medications: non-steroidal anti-inflammatory drugs (i.e. Advil, Aleve), vitamins, and supplements. (Some surgeons will want you to stop these medications prior to surgery and others may allow you to take them)  **If you are currently taking Plavix, Coumadin, Aspirin and/or other blood-thinning agents, contact your surgeon for instructions. ** Your surgeon will partner with the physician prescribing these medications to determine if it is safe to stop or if you need to continue taking. Please do not stop taking these medications without instructions from your surgeon    6. Wear comfortable clothes. Wear glasses instead of contacts. Do not bring any money or jewelry. Please bring picture ID, insurance card, and any prearranged co-payment or hospital payment. Do not wear make-up, particularly mascara the morning of your surgery.   Do not wear nail polish, particularly if you are having foot /hand surgery. Wear your hair loose or down, no ponytails, buns, heladio pins or clips. All body piercings must be removed. Please shower with antibacterial soap for three consecutive days before and on the morning of surgery, but do not apply any lotions, powders or deodorants after the shower on the day of surgery. Please use a fresh towels after each shower. Please sleep in clean clothes and change bed linens the night before surgery. Please do not shave for 48 hours prior to surgery. Shaving of the face is acceptable. 7. You should understand that if you do not follow these instructions your surgery may be cancelled. If your physical condition changes (I.e. fever, cold or flu) please contact your surgeon as soon as possible. 8. It is important that you be on time. If a situation occurs where you may be late, please call [phone] (OR Holding Area). 9. If you have any questions and or problems, please call [phone] (Pre-admission Testing). 10. Your surgery time may be subject to change. You will receive a phone call the evening prior if your time changes. 11.  If having outpatient surgery, you must have someone to drive you here, stay with you during the duration of your stay, and to drive you home at time of discharge. Special Instructions:     TAKE ALL MEDICATIONS DAY OF SURGERY EXCEPT: No exceptions      I understand a pre-operative phone call will be made to verify my surgery time. In the event that I am not available, I give permission for a message to be left on my answering service and/or with another person?   Yes [phone]         ___________________      __________   _________    (Signature of Patient)             (Witness)                (Date and Time)

## 2021-05-23 ENCOUNTER — HOSPITAL ENCOUNTER (OUTPATIENT)
Dept: PREADMISSION TESTING | Age: 36
Discharge: HOME | End: 2021-05-23
Payer: COMMERCIAL

## 2021-05-23 LAB — SARS-COV-2, COV2: NORMAL

## 2021-05-23 PROCEDURE — U0003 INFECTIOUS AGENT DETECTION BY NUCLEIC ACID (DNA OR RNA); SEVERE ACUTE RESPIRATORY SYNDROME CORONAVIRUS 2 (SARS-COV-2) (CORONAVIRUS DISEASE [COVID-19]), AMPLIFIED PROBE TECHNIQUE, MAKING USE OF HIGH THROUGHPUT TECHNOLOGIES AS DESCRIBED BY CMS-2020-01-R: HCPCS

## 2021-05-24 LAB — SARS-COV-2, COV2NT: NOT DETECTED

## 2021-05-26 ENCOUNTER — ANESTHESIA EVENT (OUTPATIENT)
Dept: SURGERY | Age: 36
End: 2021-05-26
Payer: MEDICAID

## 2021-05-27 ENCOUNTER — HOSPITAL ENCOUNTER (OUTPATIENT)
Age: 36
Setting detail: OUTPATIENT SURGERY
Discharge: HOME | End: 2021-05-27
Attending: SPECIALIST | Admitting: SPECIALIST
Payer: MEDICAID

## 2021-05-27 ENCOUNTER — ANESTHESIA (OUTPATIENT)
Dept: SURGERY | Age: 36
End: 2021-05-27
Payer: MEDICAID

## 2021-05-27 VITALS
TEMPERATURE: 98 F | BODY MASS INDEX: 26.08 KG/M2 | OXYGEN SATURATION: 100 % | WEIGHT: 162.26 LBS | HEART RATE: 60 BPM | HEIGHT: 66 IN | RESPIRATION RATE: 14 BRPM | SYSTOLIC BLOOD PRESSURE: 123 MMHG | DIASTOLIC BLOOD PRESSURE: 75 MMHG

## 2021-05-27 LAB — HCG UR QL: NEGATIVE

## 2021-05-27 PROCEDURE — 81025 URINE PREGNANCY TEST: CPT

## 2021-05-27 PROCEDURE — 74011250636 HC RX REV CODE- 250/636: Performed by: ANESTHESIOLOGY

## 2021-05-27 PROCEDURE — 76010000154 HC OR TIME FIRST 0.5 HR: Performed by: SPECIALIST

## 2021-05-27 PROCEDURE — 74011250637 HC RX REV CODE- 250/637: Performed by: SPECIALIST

## 2021-05-27 PROCEDURE — 77030010433 HC ELECTRD LP COVD -B: Performed by: SPECIALIST

## 2021-05-27 PROCEDURE — 88307 TISSUE EXAM BY PATHOLOGIST: CPT

## 2021-05-27 PROCEDURE — 76060000031 HC ANESTHESIA FIRST 0.5 HR: Performed by: SPECIALIST

## 2021-05-27 PROCEDURE — 88342 IMHCHEM/IMCYTCHM 1ST ANTB: CPT

## 2021-05-27 PROCEDURE — 74011250637 HC RX REV CODE- 250/637: Performed by: ANESTHESIOLOGY

## 2021-05-27 PROCEDURE — 77030007304 HC ELECTRD LP RND MEGA -A: Performed by: SPECIALIST

## 2021-05-27 PROCEDURE — 74011000250 HC RX REV CODE- 250: Performed by: SPECIALIST

## 2021-05-27 PROCEDURE — 74011250636 HC RX REV CODE- 250/636: Performed by: NURSE ANESTHETIST, CERTIFIED REGISTERED

## 2021-05-27 PROCEDURE — 77030019905 HC CATH URETH INTMIT MDII -A: Performed by: SPECIALIST

## 2021-05-27 PROCEDURE — 2709999900 HC NON-CHARGEABLE SUPPLY: Performed by: SPECIALIST

## 2021-05-27 PROCEDURE — 77030010432 HC ELECTRD BALL COVD -B: Performed by: SPECIALIST

## 2021-05-27 PROCEDURE — 88305 TISSUE EXAM BY PATHOLOGIST: CPT

## 2021-05-27 PROCEDURE — 76210000016 HC OR PH I REC 1 TO 1.5 HR: Performed by: SPECIALIST

## 2021-05-27 PROCEDURE — 76210000021 HC REC RM PH II 0.5 TO 1 HR: Performed by: SPECIALIST

## 2021-05-27 RX ORDER — MIDAZOLAM HYDROCHLORIDE 1 MG/ML
1 INJECTION, SOLUTION INTRAMUSCULAR; INTRAVENOUS AS NEEDED
Status: DISCONTINUED | OUTPATIENT
Start: 2021-05-27 | End: 2021-05-27 | Stop reason: HOSPADM

## 2021-05-27 RX ORDER — SODIUM CHLORIDE, SODIUM LACTATE, POTASSIUM CHLORIDE, CALCIUM CHLORIDE 600; 310; 30; 20 MG/100ML; MG/100ML; MG/100ML; MG/100ML
50 INJECTION, SOLUTION INTRAVENOUS CONTINUOUS
Status: DISCONTINUED | OUTPATIENT
Start: 2021-05-27 | End: 2021-05-27 | Stop reason: HOSPADM

## 2021-05-27 RX ORDER — ONDANSETRON 2 MG/ML
4 INJECTION INTRAMUSCULAR; INTRAVENOUS AS NEEDED
Status: DISCONTINUED | OUTPATIENT
Start: 2021-05-27 | End: 2021-05-27 | Stop reason: HOSPADM

## 2021-05-27 RX ORDER — HYDROMORPHONE HYDROCHLORIDE 1 MG/ML
0.2 INJECTION, SOLUTION INTRAMUSCULAR; INTRAVENOUS; SUBCUTANEOUS
Status: DISCONTINUED | OUTPATIENT
Start: 2021-05-27 | End: 2021-05-27 | Stop reason: HOSPADM

## 2021-05-27 RX ORDER — SODIUM CHLORIDE 0.9 % (FLUSH) 0.9 %
5-40 SYRINGE (ML) INJECTION EVERY 8 HOURS
Status: DISCONTINUED | OUTPATIENT
Start: 2021-05-27 | End: 2021-05-27 | Stop reason: HOSPADM

## 2021-05-27 RX ORDER — FENTANYL CITRATE 50 UG/ML
25 INJECTION, SOLUTION INTRAMUSCULAR; INTRAVENOUS
Status: DISCONTINUED | OUTPATIENT
Start: 2021-05-27 | End: 2021-05-27 | Stop reason: HOSPADM

## 2021-05-27 RX ORDER — FENTANYL CITRATE 50 UG/ML
50 INJECTION, SOLUTION INTRAMUSCULAR; INTRAVENOUS AS NEEDED
Status: DISCONTINUED | OUTPATIENT
Start: 2021-05-27 | End: 2021-05-27 | Stop reason: HOSPADM

## 2021-05-27 RX ORDER — SODIUM CHLORIDE 0.9 % (FLUSH) 0.9 %
5-40 SYRINGE (ML) INJECTION AS NEEDED
Status: DISCONTINUED | OUTPATIENT
Start: 2021-05-27 | End: 2021-05-27 | Stop reason: HOSPADM

## 2021-05-27 RX ORDER — SODIUM CHLORIDE, SODIUM LACTATE, POTASSIUM CHLORIDE, CALCIUM CHLORIDE 600; 310; 30; 20 MG/100ML; MG/100ML; MG/100ML; MG/100ML
25 INJECTION, SOLUTION INTRAVENOUS CONTINUOUS
Status: DISCONTINUED | OUTPATIENT
Start: 2021-05-27 | End: 2021-05-27 | Stop reason: HOSPADM

## 2021-05-27 RX ORDER — FERRIC SUBSULFATE 20-22G/100
SOLUTION, NON-ORAL MISCELLANEOUS AS NEEDED
Status: DISCONTINUED | OUTPATIENT
Start: 2021-05-27 | End: 2021-05-27 | Stop reason: HOSPADM

## 2021-05-27 RX ORDER — PROPOFOL 10 MG/ML
INJECTION, EMULSION INTRAVENOUS
Status: DISCONTINUED | OUTPATIENT
Start: 2021-05-27 | End: 2021-05-27 | Stop reason: HOSPADM

## 2021-05-27 RX ORDER — MIDAZOLAM HYDROCHLORIDE 1 MG/ML
INJECTION, SOLUTION INTRAMUSCULAR; INTRAVENOUS AS NEEDED
Status: DISCONTINUED | OUTPATIENT
Start: 2021-05-27 | End: 2021-05-27 | Stop reason: HOSPADM

## 2021-05-27 RX ORDER — FENTANYL CITRATE 50 UG/ML
INJECTION, SOLUTION INTRAMUSCULAR; INTRAVENOUS AS NEEDED
Status: DISCONTINUED | OUTPATIENT
Start: 2021-05-27 | End: 2021-05-27 | Stop reason: HOSPADM

## 2021-05-27 RX ORDER — ACETAMINOPHEN 325 MG/1
650 TABLET ORAL ONCE
Status: COMPLETED | OUTPATIENT
Start: 2021-05-27 | End: 2021-05-27

## 2021-05-27 RX ORDER — IBUPROFEN 400 MG/1
400 TABLET ORAL ONCE
Status: COMPLETED | OUTPATIENT
Start: 2021-05-27 | End: 2021-05-27

## 2021-05-27 RX ORDER — ONDANSETRON 2 MG/ML
INJECTION INTRAMUSCULAR; INTRAVENOUS AS NEEDED
Status: DISCONTINUED | OUTPATIENT
Start: 2021-05-27 | End: 2021-05-27 | Stop reason: HOSPADM

## 2021-05-27 RX ORDER — LIDOCAINE HYDROCHLORIDE 10 MG/ML
0.1 INJECTION, SOLUTION EPIDURAL; INFILTRATION; INTRACAUDAL; PERINEURAL AS NEEDED
Status: DISCONTINUED | OUTPATIENT
Start: 2021-05-27 | End: 2021-05-27 | Stop reason: HOSPADM

## 2021-05-27 RX ORDER — PROPOFOL 10 MG/ML
INJECTION, EMULSION INTRAVENOUS AS NEEDED
Status: DISCONTINUED | OUTPATIENT
Start: 2021-05-27 | End: 2021-05-27 | Stop reason: HOSPADM

## 2021-05-27 RX ADMIN — PROPOFOL 75 MCG/KG/MIN: 10 INJECTION, EMULSION INTRAVENOUS at 07:33

## 2021-05-27 RX ADMIN — ACETAMINOPHEN 650 MG: 325 TABLET ORAL at 07:04

## 2021-05-27 RX ADMIN — PROPOFOL 40 MG: 10 INJECTION, EMULSION INTRAVENOUS at 07:32

## 2021-05-27 RX ADMIN — MIDAZOLAM HYDROCHLORIDE 2 MG: 1 INJECTION, SOLUTION INTRAMUSCULAR; INTRAVENOUS at 07:28

## 2021-05-27 RX ADMIN — SODIUM CHLORIDE, POTASSIUM CHLORIDE, SODIUM LACTATE AND CALCIUM CHLORIDE 25 ML/HR: 600; 310; 30; 20 INJECTION, SOLUTION INTRAVENOUS at 07:02

## 2021-05-27 RX ADMIN — FENTANYL CITRATE 50 MCG: 50 INJECTION, SOLUTION INTRAMUSCULAR; INTRAVENOUS at 07:31

## 2021-05-27 RX ADMIN — FENTANYL CITRATE 25 MCG: 50 INJECTION, SOLUTION INTRAMUSCULAR; INTRAVENOUS at 08:36

## 2021-05-27 RX ADMIN — FENTANYL CITRATE 25 MCG: 50 INJECTION, SOLUTION INTRAMUSCULAR; INTRAVENOUS at 07:43

## 2021-05-27 RX ADMIN — FENTANYL CITRATE 25 MCG: 50 INJECTION, SOLUTION INTRAMUSCULAR; INTRAVENOUS at 08:32

## 2021-05-27 RX ADMIN — PROPOFOL 20 MG: 10 INJECTION, EMULSION INTRAVENOUS at 07:39

## 2021-05-27 RX ADMIN — Medication 3 AMPULE: at 07:03

## 2021-05-27 RX ADMIN — FENTANYL CITRATE 25 MCG: 50 INJECTION, SOLUTION INTRAMUSCULAR; INTRAVENOUS at 08:00

## 2021-05-27 RX ADMIN — ONDANSETRON HYDROCHLORIDE 4 MG: 2 INJECTION, SOLUTION INTRAMUSCULAR; INTRAVENOUS at 07:28

## 2021-05-27 RX ADMIN — IBUPROFEN 400 MG: 400 TABLET, FILM COATED ORAL at 09:15

## 2021-05-27 NOTE — PERIOP NOTES
Negative covid test result on 05/23/21.  Denies S/S  Call bell in reach, side rails up x 2 . bed in low position

## 2021-05-27 NOTE — PERIOP NOTES
Handoff Report from Operating Room to PACU    Report received from Teena Ku RN and Domingo Noonan CRNA regarding Sha Sher. Surgeon(s):  Rachel Dill MD  And Procedure(s) (LRB):  LOOP ELECTRODE EXCISION PROCEDURE OF CERVIX (N/A)  confirmed   with dressings discussed. Anesthesia type, drugs, patient history, complications, estimated blood loss, vital signs, intake and output, and last pain medication, lines and temperature were reviewed.

## 2021-05-27 NOTE — H&P
Gynecology History and Physical    Name: Jigna Rosario MRN: [id number] SSN: [id number]    YOB: 1985  Age: 30 y.o. Sex: female       Subjective:      Chief complaint:  Cervical dysplasia    Roni Bowden is a 30 y.o.  female with a history of dysplasia. Previous workup included PAP. Previous treatment measures included observation and oral contraceptives. She is admitted for Procedure(s) (LRB):  LOOP ELECTRODE EXCISION PROCEDURE OF CERVIX (N/A). The current method of family planning is none. OB History        6    Para   4    Term   4       0    AB   2    Living   2       SAB   0    TAB   2    Ectopic   0    Molar        Multiple   0    Live Births   2              Past Medical History:   Diagnosis Date    Anxiety     Psychiatric disorder      Past Surgical History:   Procedure Laterality Date    HX GYN      left ovarian cyst removal    HX TUBAL LIGATION       Social History     Occupational History    Not on file   Tobacco Use    Smoking status: Never Smoker    Smokeless tobacco: Never Used   Substance and Sexual Activity    Alcohol use: No    Drug use: No    Sexual activity: Yes     Partners: Male     Family History   Problem Relation Age of Onset    Diabetes Maternal Grandmother     Hypertension Maternal Grandmother         Allergies   Allergen Reactions    Amoxicillin Other (comments)     \"stomach pain\"    Hydrocodone Itching     Prior to Admission medications    Medication Sig Start Date End Date Taking? Authorizing Provider   sertraline (ZOLOFT) 50 mg tablet Take 50 mg by mouth daily. Yes Provider, Historical        Review of Systems:  A comprehensive review of systems was negative except for that written in the History of Present Illness.      Objective:     Vitals:    21 1013 21 0645   BP:  133/80   Pulse:  71   Resp:  16   Temp:  98.7 °F (37.1 °C)   SpO2:  100%   Weight: 72.6 kg (160 lb) 73.6 kg (162 lb 4.1 oz)   Height: 5' 6\" (1.676 m) 5' 6\" (1.676 m)       Physical Exam:  Patient without distress. Heart: Regular rate and rhythm or S1S2 present  Lung: clear to auscultation throughout lung fields, no wheezes, no rales, no rhonchi and normal respiratory effort  Abdomen: soft, nontender    Assessment:     Active Problems:    * No active hospital problems. *     Cervical dysplasia with abnormal uterine bleeding    Plan:     Procedure(s) (LRB):  LOOP ELECTRODE EXCISION PROCEDURE OF CERVIX (N/A)  Discussed the risks of surgery including the risks of bleeding, infection, deep vein thrombosis, and surgical injuries to internal organs including but not limited to the bowels, bladder, rectum, and female reproductive organs. The patient understands the risks; any and all questions were answered to the patient's satisfaction.

## 2021-05-27 NOTE — ANESTHESIA PREPROCEDURE EVALUATION
Anesthetic History   No history of anesthetic complications            Review of Systems / Medical History  Patient summary reviewed, nursing notes reviewed and pertinent labs reviewed    Pulmonary  Within defined limits                 Neuro/Psych   Within defined limits      Psychiatric history     Cardiovascular  Within defined limits                Exercise tolerance: >4 METS     GI/Hepatic/Renal  Within defined limits              Endo/Other  Within defined limits           Other Findings              Physical Exam    Airway  Mallampati: II  TM Distance: 4 - 6 cm  Neck ROM: normal range of motion   Mouth opening: Normal     Cardiovascular    Rhythm: regular  Rate: normal         Dental  No notable dental hx       Pulmonary  Breath sounds clear to auscultation               Abdominal  GI exam deferred       Other Findings            Anesthetic Plan    ASA: 1  Anesthesia type: MAC and general - backup            Anesthetic plan and risks discussed with: Patient

## 2021-05-27 NOTE — DISCHARGE INSTRUCTIONS
Patient Education        Loop Electrosurgical Excision Procedure (LEEP): What to Expect at Home  Your Recovery     LEEP removes tissue from the cervix. Your procedure removed abnormal tissue from your cervix. You may have mild cramping for several hours after the procedure. A dark brown vaginal discharge during the first week is normal. You can use a sanitary pad for the bleeding. You may also have some spotting for about 3 weeks. How long it takes to recover will depend on how much was done during the procedure. This care sheet gives you a general idea about how long it will take for you to recover. But each person recovers at a different pace. Follow the steps below to feel better as quickly as possible. How can you care for yourself at home? Activity    · You should be able to go back to your normal activities in 1 to 3 days. Medicines    · Your doctor will tell you if and when you can restart your medicines. He or she will also give you instructions about taking any new medicines.     · If you take aspirin or some other blood thinner, ask your doctor if and when to start taking it again. Make sure that you understand exactly what your doctor wants you to do.     · Take an over-the-counter pain medicine, such as acetaminophen (Tylenol), ibuprofen (Advil, Motrin), or naproxen (Aleve). Read and follow all instructions on the label.     · Do not take two or more pain medicines at the same time unless the doctor told you to. Many pain medicines have acetaminophen, which is Tylenol. Too much acetaminophen (Tylenol) can be harmful. Exercise    · Do not exercise for 1 to 3 days after the procedure. Other instructions    · Use a sanitary pad if you have bleeding.     · Do not have sexual intercourse or use tampons for 3 weeks. Do not douche. This will allow your cervix to heal.     · You can take a shower anytime after the procedure.  Ask your doctor when it is okay to take a bath.     · Be sure to have regular follow-up Pap tests. Your doctor can tell you how often you need to have Pap tests. Follow-up care is a key part of your treatment and safety. Be sure to make and go to all appointments, and call your doctor if you are having problems. It's also a good idea to know your test results and keep a list of the medicines you take. When should you call for help? Call 911 anytime you think you may need emergency care. For example, call if:    · You passed out (lost consciousness).     · You have chest pain, are short of breath, or cough up blood. Call your doctor now or seek immediate medical care if:    · You have pain that does not get better after you take pain medicine.     · You cannot pass stools or gas.     · You have signs of infection, such as:  ? Increased pain, swelling, warmth, or redness. ? A fever.     · You have bright red vaginal bleeding that soaks one or more pads in an hour, or you have large clots.     · You have vaginal discharge that has increased in amount or smells bad.     · You are sick to your stomach or cannot drink fluids.     · You have signs of a blood clot in your leg (called a deep vein thrombosis), such as:  ? Pain in your calf, back of the knee, thigh, or groin. ? Redness or swelling in your leg. Watch closely for any changes in your health, and be sure to contact your doctor if you have any problems. Where can you learn more? Go to http://www.gray.com/  Enter Y487 in the search box to learn more about \"Loop Electrosurgical Excision Procedure (LEEP): What to Expect at Home. \"  Current as of: December 17, 2020               Content Version: 12.8  © 1159-3616 [employer]. Care instructions adapted under license by [employer] (which disclaims liability or warranty for this information).  If you have questions about a medical condition or this instruction, always ask your healthcare professional. [address] any warranty or liability for your use of this information. TO PREVENT AN INFECTION      1. [address] YOUR HANDS     To prevent infection, good handwashing is the most important thing you or your caregiver can do.  Wash your hands with soap and water or use the hand  we gave you before you touch any wounds. 2. SHOWER     Use the antibacterial soap we gave you when you take a shower.  Shower with this soap until your wounds are healed.  To reach all areas of your body, you may need someone to help you.  Dont forget to clean your belly button with every shower. 3.  USE CLEAN SHEETS     Use freshly cleaned sheets on your bed after surgery.  To keep the surgery site clean, do not allow pets to sleep with you while your wound is still healing. 4. STOP SMOKING     Stop smoking, or at least cut back on smoking     Smoking slows your healing. 5.  CONTROL YOUR BLOOD SUGAR     High blood sugars slow wound healing. If you are diabetic, control your blood sugar levels before and after your surgery. DISCHARGE SUMMARY from Nurse    The following personal items are in your possession at time of discharge:    Dental Appliances: None  Visual Aid: None  Home Medications: None  Jewelry: None  Clothing: None (left in in pt. room)  Other Valuables: None             PATIENT INSTRUCTIONS:    After general anesthesia or intravenous sedation, for 24 hours or while taking prescription Narcotics:  · Limit your activities  · Do not drive and operate hazardous machinery  · Do not make important personal or business decisions  · Do  not drink alcoholic beverages  · If you have not urinated within 8 hours after discharge, please contact your surgeon on call.     Report the following to your surgeon:  · Excessive pain, swelling, redness or odor of or around the surgical area  · Temperature over 100.5  · Nausea and vomiting lasting longer than 4 hours or if unable to take medications  · Any signs of decreased circulation or nerve impairment to extremity: change in color, persistent  numbness, tingling, coldness or increase pain  · Any questions    To prevent infection remember to refer to your handout on handwashing given to you by your nurse. *  Please give a list of your current medications to your Primary Care Provider. *  Please update this list whenever your medications are discontinued, doses are      changed, or new medications (including over-the-counter products) are added. *  Please carry medication information at all times in case of emergency situations. These are general instructions for a healthy lifestyle:    No smoking/ No tobacco products/ Avoid exposure to second hand smoke    Surgeon General's Warning:  Quitting smoking now greatly reduces serious risk to your health. Obesity, smoking, and sedentary lifestyle greatly increases your risk for illness    A healthy diet, regular physical exercise & weight monitoring are important for maintaining a healthy lifestyle    You may be retaining fluid if you have a history of heart failure or if you experience any of the following symptoms:  Weight gain of 3 pounds or more overnight or 5 pounds in a week, increased swelling in our hands or feet or shortness of breath while lying flat in bed. Please call your doctor as soon as you notice any of these symptoms; do not wait until your next office visit. Recognize signs and symptoms of STROKE:    F-face looks uneven    A-arms unable to move or move unevenly    S-speech slurred or non-existent    T-time-call 911 as soon as signs and symptoms begin-DO NOT go       Back to bed or wait to see if you get better-TIME IS BRAIN. The discharge information has been reviewed with the patient. The patient verbalized understanding.

## 2021-05-27 NOTE — OP NOTES
[address]  OPERATIVE REPORT    Name:  Cedrick Correa  MR#:  [id number]  :  1985  ACCOUNT #:  [de-identified]  DATE OF SERVICE:  2021    PREOPERATIVE DIAGNOSIS:  Low-grade squamous intraepithelial lesion, recurrent. POSTOPERATIVE DIAGNOSIS:  Low-grade squamous intraepithelial lesion, recurrent. PROCEDURE PERFORMED:  LEEP cervical cone. SURGEON:  Sonal Purvis MD    ASSISTANT:       ANESTHESIA:  IV sedation. COMPLICATIONS:   .    SPECIMENS REMOVED:  Ectocervix, endocervix. IMPLANTS:   .    ESTIMATED BLOOD LOSS:  Minimal.    DRAINS:  Straight catheterization, clear urine. FINDINGS:  Multiple areas on the external cervix. DESCRIPTIVE OF OPERATIVE PROCEDURE:  After extensive counseling risks and benefits of the procedure, complication rates of the procedure as well as future pregnancy and consent being signed, she was taken to the operating room. After adequate anesthesia, she was placed in the dorsal lithotomy position, prepped and draped in usual sterile manner for the surgical procedure. Straight catheterization was performed after Monsel's and Lugol's were on the table. Careful inspection of the cervix revealed the above noted findings. A large loop was used for the external cervix and then cervical cap was then performed. The roller ball was then used for any bleeding. Upon completion, there was no active bleeding noted. The patient was awoken in the operating room and taken to recovery in stable condition. Needle, sponge, and instrument counts were correct x2 at the end of the procedure.       MD CALISTA Alberto/[email or web address]  D:  2021 8:34  T:  2021 13:17  JOB #:  [id number]

## 2021-05-27 NOTE — ANESTHESIA POSTPROCEDURE EVALUATION
Post-Anesthesia Evaluation and Assessment    Patient: Jill Tamez MRN: [id number]  SSN: [id number]    YOB: 1985  Age: 30 y.o. Sex: female      I have evaluated the patient and they are stable and ready for discharge from the PACU. Cardiovascular Function/Vital Signs  Visit Vitals  /82   Pulse (!) 59   Temp 36.8 °C (98.2 °F)   Resp 12   Ht 5' 6\" (1.676 m)   Wt 73.6 kg (162 lb 4.1 oz)   SpO2 100%   BMI 26.19 kg/m²       Patient is status post General anesthesia for Procedure(s):  LOOP ELECTRODE EXCISION PROCEDURE OF CERVIX. Nausea/Vomiting: None    Postoperative hydration reviewed and adequate. Pain:  Pain Scale 1: Numeric (0 - 10) (05/27/21 0915)  Pain Intensity 1: 3 (05/27/21 0915)   Managed    Neurological Status:   Neuro (WDL): Exceptions to WDL (05/27/21 0800)  Neuro  Neurologic State: Drowsy; Eyes open to voice (05/27/21 0800)  Orientation Level: Oriented to person;Oriented to place;Oriented to situation (05/27/21 0800)  Cognition: Follows commands (05/27/21 0800)  Speech: Clear (05/27/21 0800)  LUE Motor Response: Purposeful (05/27/21 0800)  LLE Motor Response: Purposeful (05/27/21 0800)  RUE Motor Response: Purposeful (05/27/21 0800)  RLE Motor Response: Purposeful (05/27/21 0800)   At baseline    Mental Status, Level of Consciousness: Alert and  oriented to person, place, and time    Pulmonary Status:   O2 Device: None (Room air) (05/27/21 0800)   Adequate oxygenation and airway patent    Complications related to anesthesia: None    Post-anesthesia assessment completed.  No concerns    Signed By: Aguilar Mckeon MD     May 27, 2021

## 2021-09-24 ENCOUNTER — HOSPITAL ENCOUNTER (EMERGENCY)
Age: 36
Discharge: HOME | End: 2021-09-24
Attending: EMERGENCY MEDICINE | Admitting: EMERGENCY MEDICINE
Payer: MEDICAID

## 2021-09-24 ENCOUNTER — APPOINTMENT (OUTPATIENT)
Dept: CT IMAGING | Age: 36
End: 2021-09-24
Attending: EMERGENCY MEDICINE
Payer: MEDICAID

## 2021-09-24 VITALS
HEART RATE: 62 BPM | BODY MASS INDEX: 26.57 KG/M2 | HEIGHT: 66 IN | SYSTOLIC BLOOD PRESSURE: 136 MMHG | DIASTOLIC BLOOD PRESSURE: 89 MMHG | WEIGHT: 165.34 LBS | OXYGEN SATURATION: 100 % | RESPIRATION RATE: 15 BRPM | TEMPERATURE: 99.2 F

## 2021-09-24 DIAGNOSIS — B34.9 VIRAL ILLNESS: ICD-10-CM

## 2021-09-24 DIAGNOSIS — R10.9 LEFT FLANK PAIN: Primary | ICD-10-CM

## 2021-09-24 LAB
ALBUMIN SERPL-MCNC: 4.2 G/DL (ref 3.5–5)
ALBUMIN/GLOB SERPL: 1 {RATIO} (ref 1.1–2.2)
ALP SERPL-CCNC: 56 U/L (ref 45–117)
ALT SERPL-CCNC: 16 U/L (ref 12–78)
ANION GAP SERPL CALC-SCNC: 3 MMOL/L (ref 5–15)
APPEARANCE UR: CLEAR
AST SERPL-CCNC: 15 U/L (ref 15–37)
BACTERIA URNS QL MICRO: NEGATIVE /HPF
BASOPHILS # BLD: 0 K/UL (ref 0–0.1)
BASOPHILS NFR BLD: 1 % (ref 0–1)
BILIRUB SERPL-MCNC: 0.2 MG/DL (ref 0.2–1)
BILIRUB UR QL: NEGATIVE
BUN SERPL-MCNC: 13 MG/DL (ref 6–20)
BUN/CREAT SERPL: 18 (ref 12–20)
CALCIUM SERPL-MCNC: 9.3 MG/DL (ref 8.5–10.1)
CHLORIDE SERPL-SCNC: 106 MMOL/L (ref 97–108)
CO2 SERPL-SCNC: 29 MMOL/L (ref 21–32)
COLOR UR: ABNORMAL
CREAT SERPL-MCNC: 0.74 MG/DL (ref 0.55–1.02)
DIFFERENTIAL METHOD BLD: ABNORMAL
EOSINOPHIL # BLD: 0.1 K/UL (ref 0–0.4)
EOSINOPHIL NFR BLD: 1 % (ref 0–7)
EPITH CASTS URNS QL MICRO: ABNORMAL /LPF
ERYTHROCYTE [DISTWIDTH] IN BLOOD BY AUTOMATED COUNT: 16.6 % (ref 11.5–14.5)
GLOBULIN SER CALC-MCNC: 4.4 G/DL (ref 2–4)
GLUCOSE SERPL-MCNC: 84 MG/DL (ref 65–100)
GLUCOSE UR STRIP.AUTO-MCNC: NEGATIVE MG/DL
HCT VFR BLD AUTO: 38.3 % (ref 35–47)
HGB BLD-MCNC: 12.1 G/DL (ref 11.5–16)
HGB UR QL STRIP: NEGATIVE
HYALINE CASTS URNS QL MICRO: ABNORMAL /LPF (ref 0–5)
IMM GRANULOCYTES # BLD AUTO: 0 K/UL (ref 0–0.04)
IMM GRANULOCYTES NFR BLD AUTO: 0 % (ref 0–0.5)
KETONES UR QL STRIP.AUTO: NEGATIVE MG/DL
LEUKOCYTE ESTERASE UR QL STRIP.AUTO: NEGATIVE
LIPASE SERPL-CCNC: 87 U/L (ref 73–393)
LYMPHOCYTES # BLD: 2.3 K/UL (ref 0.8–3.5)
LYMPHOCYTES NFR BLD: 42 % (ref 12–49)
MCH RBC QN AUTO: 27.1 PG (ref 26–34)
MCHC RBC AUTO-ENTMCNC: 31.6 G/DL (ref 30–36.5)
MCV RBC AUTO: 85.7 FL (ref 80–99)
MONOCYTES # BLD: 0.3 K/UL (ref 0–1)
MONOCYTES NFR BLD: 6 % (ref 5–13)
NEUTS SEG # BLD: 2.7 K/UL (ref 1.8–8)
NEUTS SEG NFR BLD: 50 % (ref 32–75)
NITRITE UR QL STRIP.AUTO: NEGATIVE
NRBC # BLD: 0 K/UL (ref 0–0.01)
NRBC BLD-RTO: 0 PER 100 WBC
PH UR STRIP: 6 [PH] (ref 5–8)
PLATELET # BLD AUTO: 246 K/UL (ref 150–400)
PMV BLD AUTO: 12.3 FL (ref 8.9–12.9)
POTASSIUM SERPL-SCNC: 4.1 MMOL/L (ref 3.5–5.1)
PROT SERPL-MCNC: 8.6 G/DL (ref 6.4–8.2)
PROT UR STRIP-MCNC: NEGATIVE MG/DL
RBC # BLD AUTO: 4.47 M/UL (ref 3.8–5.2)
RBC #/AREA URNS HPF: ABNORMAL /HPF (ref 0–5)
SODIUM SERPL-SCNC: 138 MMOL/L (ref 136–145)
SP GR UR REFRACTOMETRY: 1.02 (ref 1–1.03)
UR CULT HOLD, URHOLD: NORMAL
UROBILINOGEN UR QL STRIP.AUTO: 0.2 EU/DL (ref 0.2–1)
WBC # BLD AUTO: 5.3 K/UL (ref 3.6–11)
WBC URNS QL MICRO: ABNORMAL /HPF (ref 0–4)

## 2021-09-24 PROCEDURE — 81025 URINE PREGNANCY TEST: CPT

## 2021-09-24 PROCEDURE — 85025 COMPLETE CBC W/AUTO DIFF WBC: CPT

## 2021-09-24 PROCEDURE — 83690 ASSAY OF LIPASE: CPT

## 2021-09-24 PROCEDURE — 96374 THER/PROPH/DIAG INJ IV PUSH: CPT

## 2021-09-24 PROCEDURE — 74011250636 HC RX REV CODE- 250/636: Performed by: EMERGENCY MEDICINE

## 2021-09-24 PROCEDURE — 36415 COLL VENOUS BLD VENIPUNCTURE: CPT

## 2021-09-24 PROCEDURE — 81001 URINALYSIS AUTO W/SCOPE: CPT

## 2021-09-24 PROCEDURE — 99284 EMERGENCY DEPT VISIT MOD MDM: CPT

## 2021-09-24 PROCEDURE — 80053 COMPREHEN METABOLIC PANEL: CPT

## 2021-09-24 PROCEDURE — 96375 TX/PRO/DX INJ NEW DRUG ADDON: CPT

## 2021-09-24 PROCEDURE — 74176 CT ABD & PELVIS W/O CONTRAST: CPT

## 2021-09-24 RX ORDER — METHOCARBAMOL 500 MG/1
500 TABLET, FILM COATED ORAL SEE ADMIN INSTRUCTIONS
Qty: 30 TABLET | Refills: 0 | Status: SHIPPED | OUTPATIENT
Start: 2021-09-24

## 2021-09-24 RX ORDER — KETOROLAC TROMETHAMINE 30 MG/ML
15 INJECTION, SOLUTION INTRAMUSCULAR; INTRAVENOUS
Status: COMPLETED | OUTPATIENT
Start: 2021-09-24 | End: 2021-09-24

## 2021-09-24 RX ORDER — ONDANSETRON 2 MG/ML
4 INJECTION INTRAMUSCULAR; INTRAVENOUS
Status: COMPLETED | OUTPATIENT
Start: 2021-09-24 | End: 2021-09-24

## 2021-09-24 RX ORDER — NAPROXEN 500 MG/1
500 TABLET ORAL
Qty: 20 TABLET | Refills: 0 | Status: SHIPPED | OUTPATIENT
Start: 2021-09-24 | End: 2021-10-04

## 2021-09-24 RX ADMIN — SODIUM CHLORIDE 1000 ML: 9 INJECTION, SOLUTION INTRAVENOUS at 19:26

## 2021-09-24 RX ADMIN — ONDANSETRON 4 MG: 2 INJECTION INTRAMUSCULAR; INTRAVENOUS at 19:26

## 2021-09-24 RX ADMIN — KETOROLAC TROMETHAMINE 15 MG: 30 INJECTION, SOLUTION INTRAMUSCULAR at 19:26

## 2021-09-24 NOTE — ED TRIAGE NOTES
Pt ambulatory to ED with c/o headache, fever, lower back pain and frequent urination onset last weekend.

## 2021-09-24 NOTE — ED PROVIDER NOTES
67-year-old female with a history of anxiety and frequent urinary tract infections with prior pyelonephritis back in 2016, presents to the emergency department noting a 1 week history of progressively worsening dysuria, urinary urgency and frequency as well as gradually worsening left flank pain similar to prior pyelonephritis but not as severe. She notes some mild associated nausea but no vomiting, diarrhea, blood in the stool. She states that she has been told that she has stones within her kidney noted on prior CT scan but denies any history of prior passage of kidney stone. She has a history of prior tubal ligation and ovarian cyst removal no other prior abdominal surgeries. Denies any other cough or URI symptoms. She has not taken anything for her symptoms. Back Pain   Associated symptoms include dysuria. Pertinent negatives include no chest pain, no fever, no numbness, no headaches, no abdominal pain and no weakness. Urinary Frequency   Associated symptoms include nausea, frequency, urgency, flank pain and back pain. Pertinent negatives include no chills, no vomiting, no hematuria, no vaginal discharge and no abdominal pain.         Past Medical History:   Diagnosis Date    Anxiety     Psychiatric disorder        Past Surgical History:   Procedure Laterality Date    HX GYN      left ovarian cyst removal    HX TUBAL LIGATION           Family History:   Problem Relation Age of Onset    Diabetes Maternal Grandmother     Hypertension Maternal Grandmother        Social History     Socioeconomic History    Marital status:      Spouse name: Not on file    Number of children: Not on file    Years of education: Not on file    Highest education level: Not on file   Occupational History    Not on file   Tobacco Use    Smoking status: Never Smoker    Smokeless tobacco: Never Used   Substance and Sexual Activity    Alcohol use: No    Drug use: No    Sexual activity: Yes     Partners: Male Other Topics Concern    Not on file   Social History Narrative    Not on file     Social Determinants of Health     Financial Resource Strain:     Difficulty of Paying Living Expenses:    Food Insecurity:     Worried About Running Out of Food in the Last Year:     [address] in the Last Year:    Transportation Needs:     Lack of Transportation (Medical):  Lack of Transportation (Non-Medical):    Physical Activity:     Days of Exercise per Week:     Minutes of Exercise per Session:    Stress:     Feeling of Stress :    Social Connections:     Frequency of Communication with Friends and Family:     Frequency of Social Gatherings with Friends and Family:     Attends [employer] Services:     Active Member of Clubs or Organizations:     Attends Club or Organization Meetings:     Marital Status:    Intimate Partner Violence:     Fear of Current or Ex-Partner:     Emotionally Abused:     Physically Abused:     Sexually Abused: ALLERGIES: Amoxicillin and Hydrocodone    Review of Systems   Constitutional: Positive for activity change and appetite change. Negative for chills and fever. HENT: Negative for congestion, rhinorrhea, sinus pressure, sneezing and sore throat. Eyes: Negative for photophobia and visual disturbance. Respiratory: Negative for cough and shortness of breath. Cardiovascular: Negative for chest pain. Gastrointestinal: Positive for nausea. Negative for abdominal pain, blood in stool, constipation, diarrhea and vomiting. Genitourinary: Positive for dysuria, flank pain, frequency and urgency. Negative for difficulty urinating, hematuria, menstrual problem, vaginal bleeding and vaginal discharge. Musculoskeletal: Positive for back pain. Negative for arthralgias, myalgias and neck pain. Skin: Negative for rash and wound. Neurological: Negative for syncope, weakness, numbness and headaches.    Psychiatric/Behavioral: Negative for self-injury and suicidal ideas. All other systems reviewed and are negative. Vitals:    09/24/21 1750   BP: 136/89   Pulse: 62   Resp: 15   Temp: 99.2 °F (37.3 °C)   SpO2: 100%   Weight: 75 kg (165 lb 5.5 oz)   Height: 5' 6\" (1.676 m)            Physical Exam  Vitals and nursing note reviewed. Constitutional:       General: She is not in acute distress. Appearance: Normal appearance. She is well-developed. She is not diaphoretic. Comments: Pleasant, no acute distress. HENT:      Head: Normocephalic and atraumatic. Nose: Nose normal.   Eyes:      Extraocular Movements: Extraocular movements intact. Conjunctiva/sclera: Conjunctivae normal.      Pupils: Pupils are equal, round, and reactive to light. Cardiovascular:      Rate and Rhythm: Normal rate and regular rhythm. Heart sounds: Normal heart sounds. Pulmonary:      Effort: Pulmonary effort is normal.      Breath sounds: Normal breath sounds. Abdominal:      General: There is no distension. Palpations: Abdomen is soft. Tenderness: There is abdominal tenderness in the suprapubic area and left lower quadrant. There is left CVA tenderness. There is no right CVA tenderness, guarding or rebound. Negative signs include Carlisle's sign and McBurney's sign. Musculoskeletal:         General: No tenderness. Cervical back: Neck supple. Skin:     General: Skin is warm and dry. Neurological:      General: No focal deficit present. Mental Status: She is alert and oriented to person, place, and time. Cranial Nerves: No cranial nerve deficit. Sensory: No sensory deficit. Motor: No weakness. Coordination: Coordination normal.          MDM   29-year-old female with a history of prior UTIs and pyelonephritis presents with urinary symptoms and left flank pain. She is afebrile with vital signs stable no acute distress. She is treated symptomatically with IV fluids, pain and nausea medications.      Surprisingly UA shows no evidence of UTI or hematuria. Labs returned showing no significant abnormalities. These reassuring results were discussed with the patient at the bedside will pursue CT abdomen pelvis to further evaluate    CT abdomen pelvis shows no acute abnormalities, does show a single small nonobstructing left renal stone, similar to prior scan no other acute abnormalities. No clear explanation for the etiology for patient's flank pain. Given very reassuring work-up thus far query if symptoms are just secondary to MSK etiology or she is currently processing a viral type process with myalgias. Will Rx Naprosyn, Robaxin for symptomatic relief and recommended PCP follow-up as needed. Return precautions were given for worsening or concerns. This plan was discussed with the patient at the bedside and she stated both understanding and agreement.     Procedures

## 2021-09-26 LAB — HCG UR QL: NEGATIVE

## 2022-09-26 ENCOUNTER — APPOINTMENT (OUTPATIENT)
Dept: ULTRASOUND IMAGING | Age: 37
End: 2022-09-26
Attending: PHYSICIAN ASSISTANT
Payer: MEDICAID

## 2022-09-26 ENCOUNTER — HOSPITAL ENCOUNTER (EMERGENCY)
Age: 37
Discharge: HOME | End: 2022-09-26
Attending: EMERGENCY MEDICINE
Payer: MEDICAID

## 2022-09-26 VITALS
HEART RATE: 71 BPM | SYSTOLIC BLOOD PRESSURE: 149 MMHG | DIASTOLIC BLOOD PRESSURE: 80 MMHG | RESPIRATION RATE: 18 BRPM | OXYGEN SATURATION: 100 % | TEMPERATURE: 97.3 F

## 2022-09-26 DIAGNOSIS — N20.0 KIDNEY STONE ON LEFT SIDE: ICD-10-CM

## 2022-09-26 DIAGNOSIS — S39.012A LUMBAR STRAIN, INITIAL ENCOUNTER: Primary | ICD-10-CM

## 2022-09-26 LAB
APPEARANCE UR: ABNORMAL
BACTERIA URNS QL MICRO: ABNORMAL /HPF
BILIRUB UR QL: NEGATIVE
COLOR UR: ABNORMAL
EPITH CASTS URNS QL MICRO: ABNORMAL /LPF
GLUCOSE UR STRIP.AUTO-MCNC: NEGATIVE MG/DL
HCG UR QL: NEGATIVE
HGB UR QL STRIP: NEGATIVE
HYALINE CASTS URNS QL MICRO: ABNORMAL /LPF (ref 0–5)
KETONES UR QL STRIP.AUTO: ABNORMAL MG/DL
LEUKOCYTE ESTERASE UR QL STRIP.AUTO: NEGATIVE
NITRITE UR QL STRIP.AUTO: NEGATIVE
PH UR STRIP: 6.5 [PH] (ref 5–8)
PROT UR STRIP-MCNC: NEGATIVE MG/DL
RBC #/AREA URNS HPF: ABNORMAL /HPF (ref 0–5)
SP GR UR REFRACTOMETRY: 1.03 (ref 1–1.03)
UR CULT HOLD, URHOLD: NORMAL
UROBILINOGEN UR QL STRIP.AUTO: 0.2 EU/DL (ref 0.2–1)
WBC URNS QL MICRO: ABNORMAL /HPF (ref 0–4)

## 2022-09-26 PROCEDURE — 99284 EMERGENCY DEPT VISIT MOD MDM: CPT

## 2022-09-26 PROCEDURE — 76770 US EXAM ABDO BACK WALL COMP: CPT

## 2022-09-26 PROCEDURE — 81025 URINE PREGNANCY TEST: CPT

## 2022-09-26 PROCEDURE — 87491 CHLMYD TRACH DNA AMP PROBE: CPT

## 2022-09-26 PROCEDURE — 81001 URINALYSIS AUTO W/SCOPE: CPT

## 2022-09-26 RX ORDER — CYCLOBENZAPRINE HCL 10 MG
10 TABLET ORAL
Qty: 10 TABLET | Refills: 0 | Status: SHIPPED | OUTPATIENT
Start: 2022-09-26 | End: 2022-09-26 | Stop reason: SDUPTHER

## 2022-09-26 RX ORDER — CYCLOBENZAPRINE HCL 10 MG
10 TABLET ORAL
Qty: 10 TABLET | Refills: 0 | Status: SHIPPED | OUTPATIENT
Start: 2022-09-26

## 2022-09-26 RX ORDER — NAPROXEN 500 MG/1
500 TABLET ORAL 2 TIMES DAILY WITH MEALS
Qty: 20 TABLET | Refills: 0 | Status: SHIPPED | OUTPATIENT
Start: 2022-09-26 | End: 2022-10-06

## 2022-09-26 NOTE — ED PROVIDER NOTES
40year old F presenting to the ED for back pain. Pt reports urinary frequency and hesitancy. Has felt hot at times and felt nauseated. Points to the left lower back - x about 5 days. Pt notes that the back pain is somewhat better at rest, otherwise relatively constant. No hematuria. Slight dysuria.  + frequency. No vaginal bleeding or discharge. LMP: uterine ablation. Denies c/f STI - no discharge. No documented fever. PMHx: anxiety  Psx: list UTD  Social: non-smoker. SAHM. The history is provided by the patient. Back Pain   Pertinent negatives include no chest pain, no fever and no pelvic pain. Past Medical History:   Diagnosis Date    Anxiety     Psychiatric disorder        Past Surgical History:   Procedure Laterality Date    HX GYN      left ovarian cyst removal    HX TUBAL LIGATION           Family History:   Problem Relation Age of Onset    Diabetes Maternal Grandmother     Hypertension Maternal Grandmother        Social History     Socioeconomic History    Marital status:      Spouse name: Not on file    Number of children: Not on file    Years of education: Not on file    Highest education level: Not on file   Occupational History    Not on file   Tobacco Use    Smoking status: Never    Smokeless tobacco: Never   Substance and Sexual Activity    Alcohol use: No    Drug use: No    Sexual activity: Yes     Partners: Male   Other Topics Concern    Not on file   Social History Narrative    Not on file     Social Determinants of Health     Financial Resource Strain: Not on file   Food Insecurity: Not on file   Transportation Needs: Not on file   Physical Activity: Not on file   Stress: Not on file   Social Connections: Not on file   Intimate Partner Violence: Not on file   Housing Stability: Not on file         ALLERGIES: Amoxicillin and Hydrocodone    Review of Systems   Constitutional:  Negative for fever. HENT:  Negative for facial swelling.     Eyes:  Negative for visual disturbance. Respiratory:  Negative for shortness of breath. Cardiovascular:  Negative for chest pain. Gastrointestinal:  Positive for nausea. Negative for vomiting. Genitourinary:  Positive for frequency. Negative for pelvic pain, vaginal bleeding and vaginal discharge. Musculoskeletal:  Positive for back pain. Skin:  Negative for wound. Neurological:  Negative for syncope. All other systems reviewed and are negative. Vitals:    09/26/22 1754   BP: (!) 149/80   Pulse: 71   Resp: 18   Temp: 97.3 °F (36.3 °C)   SpO2: 100%            Physical Exam  Vitals and nursing note reviewed. Constitutional:       General: She is not in acute distress. Appearance: She is well-developed. Comments: Pleasant, well-appearing, no distress   HENT:      Head: Normocephalic and atraumatic. Right Ear: External ear normal.      Left Ear: External ear normal.   Eyes:      General: No scleral icterus. Conjunctiva/sclera: Conjunctivae normal.   Neck:      Trachea: No tracheal deviation. Cardiovascular:      Rate and Rhythm: Normal rate and regular rhythm. Heart sounds: Normal heart sounds. No murmur heard. No friction rub. No gallop. Pulmonary:      Effort: Pulmonary effort is normal. No respiratory distress. Breath sounds: Normal breath sounds. No stridor. No wheezing. Abdominal:      General: There is no distension. Palpations: Abdomen is soft. Comments: No CVA tenderness  + TTP over the left lumbar musculature  No midline spinal tenderness   Musculoskeletal:         General: Normal range of motion. Cervical back: Neck supple. Skin:     General: Skin is warm and dry. Neurological:      Mental Status: She is alert and oriented to person, place, and time.    Psychiatric:         Behavior: Behavior normal.        MDM  Number of Diagnoses or Management Options  Kidney stone on left side  Lumbar strain, initial encounter  Diagnosis management comments: 81-year-old female presenting to the ED for left lower back pain with urinary symptoms. No vaginal bleeding or discharge, denies concern for STI. No CVA tenderness. UA unremarkable, no findings concerning for infection. Renal ultrasound showing a 1.1 cm stone in the kidney but no obstructive process. Discussed these findings with patient, explained that since the stone is in the kidney and not causing an obstruction it is likely not the cause of her pain, location of pain is more in the left lower back. Discussed medications for symptomatic relief at home, return precautions given.        Amount and/or Complexity of Data Reviewed  Clinical lab tests: ordered and reviewed  Tests in the radiology section of CPT®: ordered and reviewed  Discuss the patient with other providers: yes (Dr. Ashlyn Grigsby, ED attending)           Procedures

## 2022-09-26 NOTE — DISCHARGE INSTRUCTIONS
Return for new or worsening symptoms. Follow up with PCP. Your US showed a stone, but it is still in the kidney - these stones do not cause symptoms unless they try to leave the kidney and travel down the ureter. Your urine test did not appear infected. It is likely that the pain in the back is muscular in nature. You have been written for a muscle relaxer that you may take. You should also take an NSAID of your choice (motrin, advil, aleve, naproxen, ibuprofen) and follow up with your PCP.     If you develop significant LEFT flank pain or blood in your urine - call the kidney stone hotline at [phone]

## 2022-09-28 LAB
C TRACH RRNA SPEC QL NAA+PROBE: NEGATIVE
N GONORRHOEA RRNA SPEC QL NAA+PROBE: NEGATIVE
SPECIMEN SOURCE: NORMAL

## 2023-07-24 ENCOUNTER — APPOINTMENT (OUTPATIENT)
Facility: HOSPITAL | Age: 38
End: 2023-07-24
Payer: MEDICAID

## 2023-07-24 ENCOUNTER — HOSPITAL ENCOUNTER (EMERGENCY)
Facility: HOSPITAL | Age: 38
Discharge: HOME | End: 2023-07-24
Attending: STUDENT IN AN ORGANIZED HEALTH CARE EDUCATION/TRAINING PROGRAM
Payer: MEDICAID

## 2023-07-24 VITALS
HEIGHT: 66 IN | OXYGEN SATURATION: 100 % | RESPIRATION RATE: 18 BRPM | WEIGHT: 153.66 LBS | TEMPERATURE: 97.5 F | DIASTOLIC BLOOD PRESSURE: 79 MMHG | BODY MASS INDEX: 24.7 KG/M2 | SYSTOLIC BLOOD PRESSURE: 138 MMHG | HEART RATE: 74 BPM

## 2023-07-24 DIAGNOSIS — M25.532 LEFT WRIST PAIN: Primary | ICD-10-CM

## 2023-07-24 PROCEDURE — 73110 X-RAY EXAM OF WRIST: CPT

## 2023-07-24 PROCEDURE — 99283 EMERGENCY DEPT VISIT LOW MDM: CPT

## 2023-07-24 ASSESSMENT — ENCOUNTER SYMPTOMS
COUGH: 0
VOMITING: 0
DIARRHEA: 0

## 2023-07-24 ASSESSMENT — LIFESTYLE VARIABLES
HOW MANY STANDARD DRINKS CONTAINING ALCOHOL DO YOU HAVE ON A TYPICAL DAY: PATIENT DOES NOT DRINK
HOW OFTEN DO YOU HAVE A DRINK CONTAINING ALCOHOL: NEVER

## 2023-07-24 ASSESSMENT — PAIN DESCRIPTION - DESCRIPTORS: DESCRIPTORS: ACHING;THROBBING;SHARP

## 2023-07-24 ASSESSMENT — PAIN DESCRIPTION - LOCATION: LOCATION: WRIST

## 2023-07-24 ASSESSMENT — PAIN DESCRIPTION - ORIENTATION: ORIENTATION: LEFT

## 2023-07-24 NOTE — ED TRIAGE NOTES
Pt arrived ambulatory to the ER with CC left wrist pain. Pain 7/10, pt took tylenol at 1600. Two cysts noted on left wrist, tender to palpate. One cyst has been there for a year, but the second developed yesterday and is occasionally red. +tingling. Pt also states one is located on her back. Pt denies N/V/D, SOB, CP, Fever/chills.

## 2023-07-24 NOTE — ED PROVIDER NOTES
[facility] EMERGENCY DEP  EMERGENCY DEPARTMENT ENCOUNTER      Pt Name: Misbah Zapata  MRN: [id number]  [address] 1985  Date of evaluation: 7/24/2023  Provider: Chicho Torres       Chief Complaint   Patient presents with    Cyst         HISTORY OF PRESENT ILLNESS   (Location/Symptom, Timing/Onset, Context/Setting, Quality, Duration, Modifying Factors, Severity)  Note limiting factors. 41 y/o female presenting with complaint of wrist pain. The patient states she has had a swollen area of her left wrist of the past year, and yesterday she noticed it had increased in size, with associated aching pain that is relieved by tylenol. She also reports having a cyst on her back for \"a long time. \" She denies fevers, redness, generalized body aches, weakness or numbness. The history is provided by the patient. Review of External Medical Records:     Nursing Notes were reviewed. REVIEW OF SYSTEMS    (2-9 systems for level 4, 10 or more for level 5)     Review of Systems   Constitutional:  Negative for chills and fever. HENT:  Negative for congestion. Respiratory:  Negative for cough. Gastrointestinal:  Negative for diarrhea and vomiting. Musculoskeletal:  Positive for arthralgias (left wrist pain). Negative for myalgias. Skin:  Negative for wound. Neurological:  Negative for weakness and numbness. Except as noted above the remainder of the review of systems was reviewed and negative.        PAST MEDICAL HISTORY     Past Medical History:   Diagnosis Date    Anxiety     Psychiatric disorder          SURGICAL HISTORY       Past Surgical History:   Procedure Laterality Date    GYN      left ovarian cyst removal    TUBAL LIGATION           CURRENT MEDICATIONS       Discharge Medication List as of 7/24/2023  8:18 PM        CONTINUE these medications which have NOT CHANGED    Details   cyclobenzaprine (FLEXERIL) 10 MG tablet Take 10 mg by mouth 3 times daily as

## 2023-08-15 RX ORDER — FENTANYL CITRATE 50 UG/ML
100 INJECTION, SOLUTION INTRAMUSCULAR; INTRAVENOUS
Status: CANCELLED | OUTPATIENT
Start: 2023-08-15 | End: 2023-08-16

## 2023-08-15 RX ORDER — SODIUM CHLORIDE 0.9 % (FLUSH) 0.9 %
5-40 SYRINGE (ML) INJECTION PRN
Status: CANCELLED | OUTPATIENT
Start: 2023-08-15

## 2023-08-15 RX ORDER — SODIUM CHLORIDE 0.9 % (FLUSH) 0.9 %
5-40 SYRINGE (ML) INJECTION EVERY 12 HOURS SCHEDULED
Status: CANCELLED | OUTPATIENT
Start: 2023-08-15

## 2023-08-15 RX ORDER — LIDOCAINE HYDROCHLORIDE 10 MG/ML
1 INJECTION, SOLUTION INFILTRATION; PERINEURAL
Status: CANCELLED | OUTPATIENT
Start: 2023-08-15 | End: 2023-08-16

## 2023-08-15 RX ORDER — MIDAZOLAM HYDROCHLORIDE 2 MG/2ML
2 INJECTION, SOLUTION INTRAMUSCULAR; INTRAVENOUS
Status: CANCELLED | OUTPATIENT
Start: 2023-08-15 | End: 2023-08-16

## 2023-08-15 RX ORDER — SODIUM CHLORIDE, SODIUM LACTATE, POTASSIUM CHLORIDE, CALCIUM CHLORIDE 600; 310; 30; 20 MG/100ML; MG/100ML; MG/100ML; MG/100ML
INJECTION, SOLUTION INTRAVENOUS CONTINUOUS
Status: CANCELLED | OUTPATIENT
Start: 2023-08-15

## 2023-08-15 RX ORDER — ACETAMINOPHEN 500 MG
1000 TABLET ORAL ONCE
Status: CANCELLED | OUTPATIENT
Start: 2023-08-15 | End: 2023-08-15

## 2023-08-15 RX ORDER — SODIUM CHLORIDE 9 MG/ML
INJECTION, SOLUTION INTRAVENOUS PRN
Status: CANCELLED | OUTPATIENT
Start: 2023-08-15

## 2023-08-15 NOTE — H&P
ASSESSMENT:  1. Ganglion cyst of wrist, left          There is no problem list on file for this patient. PLAN:  Treatment Plan: She has a ganglion cyst on the ulnar aspect of her wrist. We discussed operative and nonoperative treatment options including observation, aspiration, and surgical excision. She elected for surgical excision. We discussed reasonable expectations. We discussed the usual postoperative course. Sedation anesthesia. She has given informed consent to proceed. Orders Placed This Encounter    Surgical Posting Sheet      Follow-up: Return for first postoperative visit. Supervising physician: Celina Zamora     HISTORY OF PRESENT ILLNESS:  Chief Complaint: Cyst of the Left Wrist   Age: 40 y.o. Sex: female   History of present illness: Deniz Huff  is a pleasant 40 y.o. female who presents today for evaluation of the left wrist.  She reports a 1 year history of a cyst on the ulnar aspect of her wrist.  She states that this has recently grown in size. She states the cyst is tender to touch. She has occasional numbness and tingling in the left ring and little finger that resolves with rest.  She presented to the emergency room on 07/24/2023 for evaluation of her cyst.  X-rays were negative. She has tried Tylenol and Motrin. Past medical history, past surgical history, medications, allergies, social history, and review of systems have been reviewed by me. No current facility-administered medications on file prior to encounter.      Current Outpatient Medications on File Prior to Encounter   Medication Sig Dispense Refill    cyclobenzaprine (FLEXERIL) 10 MG tablet Take 10 mg by mouth 3 times daily as needed      methocarbamol (ROBAXIN) 500 MG tablet Take 500 mg by mouth See Admin Instructions      sertraline (ZOLOFT) 50 MG tablet Take 50 mg by mouth daily         Allergies   Allergen Reactions    Amoxicillin Other (See Comments)     \"stomach pain\"    Hydrocodone

## 2023-08-16 ENCOUNTER — ANESTHESIA EVENT (OUTPATIENT)
Facility: HOSPITAL | Age: 38
End: 2023-08-16
Payer: MEDICAID

## 2023-08-16 ENCOUNTER — HOSPITAL ENCOUNTER (OUTPATIENT)
Facility: HOSPITAL | Age: 38
Discharge: HOME | End: 2023-08-16
Attending: ORTHOPAEDIC SURGERY | Admitting: ORTHOPAEDIC SURGERY
Payer: MEDICAID

## 2023-08-16 ENCOUNTER — ANESTHESIA (OUTPATIENT)
Facility: HOSPITAL | Age: 38
End: 2023-08-16
Payer: MEDICAID

## 2023-08-16 VITALS
RESPIRATION RATE: 12 BRPM | OXYGEN SATURATION: 98 % | SYSTOLIC BLOOD PRESSURE: 148 MMHG | DIASTOLIC BLOOD PRESSURE: 83 MMHG | TEMPERATURE: 98.3 F | HEIGHT: 66 IN | HEART RATE: 53 BPM | BODY MASS INDEX: 24.11 KG/M2 | WEIGHT: 150 LBS

## 2023-08-16 DIAGNOSIS — M67.432 GANGLION CYST OF VOLAR ASPECT OF LEFT WRIST: Primary | ICD-10-CM

## 2023-08-16 PROCEDURE — 2720000010 HC SURG SUPPLY STERILE: Performed by: ORTHOPAEDIC SURGERY

## 2023-08-16 PROCEDURE — 3700000001 HC ADD 15 MINUTES (ANESTHESIA): Performed by: ORTHOPAEDIC SURGERY

## 2023-08-16 PROCEDURE — 6360000002 HC RX W HCPCS: Performed by: ANESTHESIOLOGY

## 2023-08-16 PROCEDURE — 6360000002 HC RX W HCPCS: Performed by: ORTHOPAEDIC SURGERY

## 2023-08-16 PROCEDURE — 3600000002 HC SURGERY LEVEL 2 BASE: Performed by: ORTHOPAEDIC SURGERY

## 2023-08-16 PROCEDURE — 7100000000 HC PACU RECOVERY - FIRST 15 MIN: Performed by: ORTHOPAEDIC SURGERY

## 2023-08-16 PROCEDURE — 3600000012 HC SURGERY LEVEL 2 ADDTL 15MIN: Performed by: ORTHOPAEDIC SURGERY

## 2023-08-16 PROCEDURE — 2709999900 HC NON-CHARGEABLE SUPPLY: Performed by: ORTHOPAEDIC SURGERY

## 2023-08-16 PROCEDURE — 7100000001 HC PACU RECOVERY - ADDTL 15 MIN: Performed by: ORTHOPAEDIC SURGERY

## 2023-08-16 PROCEDURE — 3700000000 HC ANESTHESIA ATTENDED CARE: Performed by: ORTHOPAEDIC SURGERY

## 2023-08-16 PROCEDURE — 2500000003 HC RX 250 WO HCPCS: Performed by: ORTHOPAEDIC SURGERY

## 2023-08-16 PROCEDURE — 2580000003 HC RX 258: Performed by: ANESTHESIOLOGY

## 2023-08-16 PROCEDURE — 2500000003 HC RX 250 WO HCPCS: Performed by: ANESTHESIOLOGY

## 2023-08-16 RX ORDER — PROCHLORPERAZINE EDISYLATE 5 MG/ML
5 INJECTION INTRAMUSCULAR; INTRAVENOUS
Status: DISCONTINUED | OUTPATIENT
Start: 2023-08-16 | End: 2023-08-16 | Stop reason: HOSPADM

## 2023-08-16 RX ORDER — OXYCODONE HYDROCHLORIDE 5 MG/1
5 TABLET ORAL
Status: DISCONTINUED | OUTPATIENT
Start: 2023-08-16 | End: 2023-08-16 | Stop reason: HOSPADM

## 2023-08-16 RX ORDER — ACETAMINOPHEN AND CODEINE PHOSPHATE 300; 30 MG/1; MG/1
1 TABLET ORAL EVERY 4 HOURS PRN
Qty: 18 TABLET | Refills: 0 | Status: SHIPPED | OUTPATIENT
Start: 2023-08-16 | End: 2023-08-19

## 2023-08-16 RX ORDER — ONDANSETRON 2 MG/ML
4 INJECTION INTRAMUSCULAR; INTRAVENOUS
Status: DISCONTINUED | OUTPATIENT
Start: 2023-08-16 | End: 2023-08-16 | Stop reason: HOSPADM

## 2023-08-16 RX ORDER — DEXMEDETOMIDINE HYDROCHLORIDE 100 UG/ML
INJECTION, SOLUTION INTRAVENOUS PRN
Status: DISCONTINUED | OUTPATIENT
Start: 2023-08-16 | End: 2023-08-16 | Stop reason: SDUPTHER

## 2023-08-16 RX ORDER — LIDOCAINE HYDROCHLORIDE 20 MG/ML
INJECTION, SOLUTION EPIDURAL; INFILTRATION; INTRACAUDAL; PERINEURAL PRN
Status: DISCONTINUED | OUTPATIENT
Start: 2023-08-16 | End: 2023-08-16 | Stop reason: SDUPTHER

## 2023-08-16 RX ORDER — MIDAZOLAM HYDROCHLORIDE 1 MG/ML
INJECTION INTRAMUSCULAR; INTRAVENOUS PRN
Status: DISCONTINUED | OUTPATIENT
Start: 2023-08-16 | End: 2023-08-16 | Stop reason: SDUPTHER

## 2023-08-16 RX ORDER — SODIUM CHLORIDE 0.9 % (FLUSH) 0.9 %
5-40 SYRINGE (ML) INJECTION EVERY 12 HOURS SCHEDULED
Status: DISCONTINUED | OUTPATIENT
Start: 2023-08-16 | End: 2023-08-16 | Stop reason: HOSPADM

## 2023-08-16 RX ORDER — SODIUM CHLORIDE 9 MG/ML
INJECTION, SOLUTION INTRAVENOUS PRN
Status: DISCONTINUED | OUTPATIENT
Start: 2023-08-16 | End: 2023-08-16 | Stop reason: HOSPADM

## 2023-08-16 RX ORDER — SODIUM CHLORIDE, SODIUM LACTATE, POTASSIUM CHLORIDE, CALCIUM CHLORIDE 600; 310; 30; 20 MG/100ML; MG/100ML; MG/100ML; MG/100ML
INJECTION, SOLUTION INTRAVENOUS CONTINUOUS PRN
Status: DISCONTINUED | OUTPATIENT
Start: 2023-08-16 | End: 2023-08-16 | Stop reason: SDUPTHER

## 2023-08-16 RX ORDER — SODIUM CHLORIDE 0.9 % (FLUSH) 0.9 %
5-40 SYRINGE (ML) INJECTION PRN
Status: DISCONTINUED | OUTPATIENT
Start: 2023-08-16 | End: 2023-08-16 | Stop reason: HOSPADM

## 2023-08-16 RX ORDER — HYDRALAZINE HYDROCHLORIDE 20 MG/ML
10 INJECTION INTRAMUSCULAR; INTRAVENOUS
Status: DISCONTINUED | OUTPATIENT
Start: 2023-08-16 | End: 2023-08-16 | Stop reason: HOSPADM

## 2023-08-16 RX ADMIN — PROPOFOL 85 MCG/KG/MIN: 10 INJECTION, EMULSION INTRAVENOUS at 10:05

## 2023-08-16 RX ADMIN — SODIUM CHLORIDE, POTASSIUM CHLORIDE, SODIUM LACTATE AND CALCIUM CHLORIDE: 600; 310; 30; 20 INJECTION, SOLUTION INTRAVENOUS at 09:56

## 2023-08-16 RX ADMIN — MIDAZOLAM 2 MG: 1 INJECTION INTRAMUSCULAR; INTRAVENOUS at 09:56

## 2023-08-16 RX ADMIN — PROPOFOL 50 MG: 10 INJECTION, EMULSION INTRAVENOUS at 10:01

## 2023-08-16 RX ADMIN — PROPOFOL 75 MCG/KG/MIN: 10 INJECTION, EMULSION INTRAVENOUS at 10:02

## 2023-08-16 RX ADMIN — DEXMEDETOMIDINE HYDROCHLORIDE 4 MCG: 100 INJECTION, SOLUTION, CONCENTRATE INTRAVENOUS at 10:03

## 2023-08-16 RX ADMIN — LIDOCAINE HYDROCHLORIDE 50 MG: 20 INJECTION, SOLUTION EPIDURAL; INFILTRATION; INTRACAUDAL; PERINEURAL at 10:03

## 2023-08-16 ASSESSMENT — PAIN SCALES - GENERAL: PAINLEVEL_OUTOF10: 0

## 2023-08-16 NOTE — BRIEF OP NOTE
Brief Postoperative Note      Patient: Shelby Goins  YOB: 1985  MRN: [id number]    Date of Procedure: 8/16/2023    Pre-Op Diagnosis Codes:     * Ganglion of wrist, left [M67.432]    Post-Op Diagnosis: Same       Procedure(s):  EXCISION LEFT WRIST GANGLION    Surgeon(s):  John Holland MD    Assistant:  * No surgical staff found *    Anesthesia: Monitor Anesthesia Care    Estimated Blood Loss (mL): Minimal    Complications: None    Specimens:   * No specimens in log *    Implants:  * No implants in log *      Drains: * No LDAs found *    Findings: large ulnar sided wrist ganglion      Electronically signed by Valentín Rosales MD on 8/16/2023 at 10:25 AM

## 2023-08-16 NOTE — ANESTHESIA POSTPROCEDURE EVALUATION
Department of Anesthesiology  Postprocedure Note    Patient: Germán Hennessy  MRN: [id number]  YOB: 1985  Date of evaluation: 8/16/2023      Procedure Summary     Date: 08/16/23 Room / Location: [facility] ASU A1 / [facility] AMBULATORY OR    Anesthesia Start: 0956 Anesthesia Stop: 1031    Procedure: EXCISION LEFT WRIST GANGLION (Left: Arm Lower) Diagnosis:       Ganglion of wrist, left      (Ganglion of wrist, left [K79.210])    Surgeons: Jenniffer Ovalle MD Responsible Provider: Mari Estrada MD    Anesthesia Type: MAC ASA Status: 2          Anesthesia Type: MAC    Rhea Phase I: Rhea Score: 10    Rhea Phase II:        Anesthesia Post Evaluation    Patient location during evaluation: PACU  Patient participation: complete - patient participated  Level of consciousness: awake  Pain score: 2  Airway patency: patent  Nausea & Vomiting: no nausea  Complications: no  Cardiovascular status: blood pressure returned to baseline  Respiratory status: acceptable  Hydration status: euvolemic  Pain management: adequate

## 2023-08-16 NOTE — DISCHARGE INSTRUCTIONS
Dr. Arthur Hope Upper Extremity Postoperative Instructions      1. Please maintain the dressing and splint placed at surgery until your follow up appointment where it will be removed. Please keep the dressing clean and dry. If you have any questions or problems, please call our office at [phone]. 2. Please elevate the operative extremity to the level of the heart to keep swelling at a minimum. You may get up to move around, but when seated please keep the extremity elevated as much as possible. This will decrease swelling and pain. 3. You may resume activities as tolerated. 4. You may ice your Arm/Hand 5 times a day for 20 minutes at a time. 5. You had a nerve block before surgery. Expect this to wear off around 8-12 hours after you received it. 6. A prescription for pain medication is provided. Take it as needed. You may also use over the counter medications for pain. 7. If you experience any redness, increased pain, increased swelling not relieved by elevation, drainage, fever, or chills, please call the office at [phone]. Please Follow-up at my office [address], [address] in 2 weeks unless otherwise directed.

## 2023-08-16 NOTE — OP NOTE
[facility]  OPERATIVE REPORT    Name:  Denise Douglass  MR#:  [id number]  :  1985  ACCOUNT #:  [de-identified]  DATE OF SERVICE:  2023    PREOPERATIVE DIAGNOSIS:  Left wrist ganglion. POSTOPERATIVE DIAGNOSIS:  Left wrist ganglion. PROCEDURE PERFORMED:  Excision of left wrist ganglion. SURGEON:  Sosa Almonte MD    ASSISTANT:  KATI Vu    ANESTHESIA:  Sedation. COMPLICATIONS:  None. SPECIMENS REMOVED:  None. IMPLANTS:  None. ESTIMATED BLOOD LOSS:  Minimal.    INDICATIONS:  This is a 67-year-old female with a ganglion on the ulnar side of the wrist which causes pain. She has requested surgical excision. We have discussed risks, benefits, potential complications and alternatives to surgery and she has given informed consent to proceed. PROCEDURE:  The patient was identified in the preoperative holding area. Informed consent was obtained. The operative site was marked. She was then transported to the OR and placed supine on the operating table. All bony prominences were well-padded. A tourniquet was applied to the upper brachium. After induction of deep sedation, the planned surgical site was anesthetized. The left upper extremity was then sterilely prepped and draped in the usual fashion. Surgical time-out was held. The operative site was confirmed. Preop antibiotics were used. After Esmarch exsanguination, the tourniquet was elevated to 250 mmHg. A longitudinal incision was made on the ulnar border of the forearm over the mass. Large skin flaps were raised. The mass was easily identified. This appeared to be a large ganglion cyst.  The ulnar sensory nerve was identified and protected throughout the remainder of the case. The mass was traced down to a stalk emanating from the ulnar wrist capsule. The ganglion was excised. A small capsulectomy was performed. Bleeding was easily controlled. The wound was irrigated.   Skin was closed

## 2023-10-17 ENCOUNTER — ANESTHESIA EVENT (OUTPATIENT)
Facility: HOSPITAL | Age: 38
End: 2023-10-17
Payer: MEDICAID

## 2023-10-17 RX ORDER — SODIUM CHLORIDE 0.9 % (FLUSH) 0.9 %
5-40 SYRINGE (ML) INJECTION EVERY 12 HOURS SCHEDULED
Status: CANCELLED | OUTPATIENT
Start: 2023-10-17

## 2023-10-17 RX ORDER — OXYCODONE HYDROCHLORIDE 5 MG/1
5 TABLET ORAL
Status: CANCELLED | OUTPATIENT
Start: 2023-10-17 | End: 2023-10-18

## 2023-10-17 RX ORDER — PROCHLORPERAZINE EDISYLATE 5 MG/ML
5 INJECTION INTRAMUSCULAR; INTRAVENOUS
Status: CANCELLED | OUTPATIENT
Start: 2023-10-17 | End: 2023-10-18

## 2023-10-17 RX ORDER — HYDRALAZINE HYDROCHLORIDE 20 MG/ML
10 INJECTION INTRAMUSCULAR; INTRAVENOUS
Status: CANCELLED | OUTPATIENT
Start: 2023-10-17

## 2023-10-17 RX ORDER — ONDANSETRON 2 MG/ML
4 INJECTION INTRAMUSCULAR; INTRAVENOUS
Status: CANCELLED | OUTPATIENT
Start: 2023-10-17 | End: 2023-10-18

## 2023-10-17 RX ORDER — FENTANYL CITRATE 50 UG/ML
25 INJECTION, SOLUTION INTRAMUSCULAR; INTRAVENOUS EVERY 5 MIN PRN
Status: CANCELLED | OUTPATIENT
Start: 2023-10-17

## 2023-10-17 RX ORDER — SODIUM CHLORIDE 9 MG/ML
INJECTION, SOLUTION INTRAVENOUS PRN
Status: CANCELLED | OUTPATIENT
Start: 2023-10-17

## 2023-10-17 RX ORDER — SODIUM CHLORIDE 0.9 % (FLUSH) 0.9 %
5-40 SYRINGE (ML) INJECTION PRN
Status: CANCELLED | OUTPATIENT
Start: 2023-10-17

## 2023-10-18 ENCOUNTER — HOSPITAL ENCOUNTER (OUTPATIENT)
Facility: HOSPITAL | Age: 38
Setting detail: OUTPATIENT SURGERY
Discharge: HOME | End: 2023-10-18
Attending: ORTHOPAEDIC SURGERY | Admitting: ORTHOPAEDIC SURGERY
Payer: MEDICAID

## 2023-10-18 ENCOUNTER — ANESTHESIA (OUTPATIENT)
Facility: HOSPITAL | Age: 38
End: 2023-10-18
Payer: MEDICAID

## 2023-10-18 VITALS
BODY MASS INDEX: 23.84 KG/M2 | RESPIRATION RATE: 12 BRPM | WEIGHT: 148.37 LBS | OXYGEN SATURATION: 100 % | SYSTOLIC BLOOD PRESSURE: 149 MMHG | DIASTOLIC BLOOD PRESSURE: 91 MMHG | TEMPERATURE: 97.8 F | HEIGHT: 66 IN | HEART RATE: 62 BPM

## 2023-10-18 DIAGNOSIS — G56.22 CUBITAL TUNNEL SYNDROME ON LEFT: Primary | ICD-10-CM

## 2023-10-18 PROCEDURE — 3600000012 HC SURGERY LEVEL 2 ADDTL 15MIN: Performed by: ORTHOPAEDIC SURGERY

## 2023-10-18 PROCEDURE — 3600000002 HC SURGERY LEVEL 2 BASE: Performed by: ORTHOPAEDIC SURGERY

## 2023-10-18 PROCEDURE — 7100000000 HC PACU RECOVERY - FIRST 15 MIN: Performed by: ORTHOPAEDIC SURGERY

## 2023-10-18 PROCEDURE — 2500000003 HC RX 250 WO HCPCS: Performed by: ORTHOPAEDIC SURGERY

## 2023-10-18 PROCEDURE — 7100000001 HC PACU RECOVERY - ADDTL 15 MIN: Performed by: ORTHOPAEDIC SURGERY

## 2023-10-18 PROCEDURE — 6360000002 HC RX W HCPCS: Performed by: NURSE ANESTHETIST, CERTIFIED REGISTERED

## 2023-10-18 PROCEDURE — 3700000000 HC ANESTHESIA ATTENDED CARE: Performed by: ORTHOPAEDIC SURGERY

## 2023-10-18 PROCEDURE — 6360000002 HC RX W HCPCS: Performed by: STUDENT IN AN ORGANIZED HEALTH CARE EDUCATION/TRAINING PROGRAM

## 2023-10-18 PROCEDURE — 2709999900 HC NON-CHARGEABLE SUPPLY: Performed by: ORTHOPAEDIC SURGERY

## 2023-10-18 PROCEDURE — 64415 NJX AA&/STRD BRCH PLXS IMG: CPT | Performed by: STUDENT IN AN ORGANIZED HEALTH CARE EDUCATION/TRAINING PROGRAM

## 2023-10-18 PROCEDURE — 2720000010 HC SURG SUPPLY STERILE: Performed by: ORTHOPAEDIC SURGERY

## 2023-10-18 PROCEDURE — 3700000001 HC ADD 15 MINUTES (ANESTHESIA): Performed by: ORTHOPAEDIC SURGERY

## 2023-10-18 PROCEDURE — 2500000003 HC RX 250 WO HCPCS: Performed by: NURSE ANESTHETIST, CERTIFIED REGISTERED

## 2023-10-18 RX ORDER — FENTANYL CITRATE 50 UG/ML
100 INJECTION, SOLUTION INTRAMUSCULAR; INTRAVENOUS
Status: COMPLETED | OUTPATIENT
Start: 2023-10-18 | End: 2023-10-18

## 2023-10-18 RX ORDER — SODIUM CHLORIDE 0.9 % (FLUSH) 0.9 %
5-40 SYRINGE (ML) INJECTION PRN
Status: DISCONTINUED | OUTPATIENT
Start: 2023-10-18 | End: 2023-10-18 | Stop reason: HOSPADM

## 2023-10-18 RX ORDER — SODIUM CHLORIDE, SODIUM LACTATE, POTASSIUM CHLORIDE, CALCIUM CHLORIDE 600; 310; 30; 20 MG/100ML; MG/100ML; MG/100ML; MG/100ML
INJECTION, SOLUTION INTRAVENOUS CONTINUOUS
Status: DISCONTINUED | OUTPATIENT
Start: 2023-10-18 | End: 2023-10-18 | Stop reason: HOSPADM

## 2023-10-18 RX ORDER — SODIUM CHLORIDE 9 MG/ML
INJECTION, SOLUTION INTRAVENOUS PRN
Status: DISCONTINUED | OUTPATIENT
Start: 2023-10-18 | End: 2023-10-18 | Stop reason: HOSPADM

## 2023-10-18 RX ORDER — ACETAMINOPHEN 500 MG
1000 TABLET ORAL ONCE
Status: DISCONTINUED | OUTPATIENT
Start: 2023-10-18 | End: 2023-10-18 | Stop reason: HOSPADM

## 2023-10-18 RX ORDER — LIDOCAINE HYDROCHLORIDE 20 MG/ML
INJECTION, SOLUTION EPIDURAL; INFILTRATION; INTRACAUDAL; PERINEURAL PRN
Status: DISCONTINUED | OUTPATIENT
Start: 2023-10-18 | End: 2023-10-18 | Stop reason: SDUPTHER

## 2023-10-18 RX ORDER — ROPIVACAINE HYDROCHLORIDE 5 MG/ML
INJECTION, SOLUTION EPIDURAL; INFILTRATION; PERINEURAL
Status: COMPLETED | OUTPATIENT
Start: 2023-10-18 | End: 2023-10-18

## 2023-10-18 RX ORDER — LIDOCAINE HYDROCHLORIDE 10 MG/ML
1 INJECTION, SOLUTION INFILTRATION; PERINEURAL
Status: DISCONTINUED | OUTPATIENT
Start: 2023-10-18 | End: 2023-10-18 | Stop reason: HOSPADM

## 2023-10-18 RX ORDER — SODIUM CHLORIDE 0.9 % (FLUSH) 0.9 %
5-40 SYRINGE (ML) INJECTION EVERY 12 HOURS SCHEDULED
Status: DISCONTINUED | OUTPATIENT
Start: 2023-10-18 | End: 2023-10-18 | Stop reason: HOSPADM

## 2023-10-18 RX ORDER — MIDAZOLAM HYDROCHLORIDE 2 MG/2ML
2 INJECTION, SOLUTION INTRAMUSCULAR; INTRAVENOUS
Status: COMPLETED | OUTPATIENT
Start: 2023-10-18 | End: 2023-10-18

## 2023-10-18 RX ORDER — DEXMEDETOMIDINE HYDROCHLORIDE 100 UG/ML
INJECTION, SOLUTION INTRAVENOUS PRN
Status: DISCONTINUED | OUTPATIENT
Start: 2023-10-18 | End: 2023-10-18 | Stop reason: SDUPTHER

## 2023-10-18 RX ORDER — TRAMADOL HYDROCHLORIDE 50 MG/1
50 TABLET ORAL EVERY 6 HOURS PRN
Qty: 12 TABLET | Refills: 0 | Status: SHIPPED | OUTPATIENT
Start: 2023-10-18 | End: 2023-10-21

## 2023-10-18 RX ADMIN — MIDAZOLAM 2 MG: 1 INJECTION INTRAMUSCULAR; INTRAVENOUS at 08:40

## 2023-10-18 RX ADMIN — PROPOFOL 100 MCG/KG/MIN: 10 INJECTION, EMULSION INTRAVENOUS at 08:59

## 2023-10-18 RX ADMIN — LIDOCAINE HYDROCHLORIDE 60 MG: 20 INJECTION, SOLUTION EPIDURAL; INFILTRATION; INTRACAUDAL; PERINEURAL at 08:58

## 2023-10-18 RX ADMIN — DEXMEDETOMIDINE HYDROCHLORIDE 10 MCG: 100 INJECTION, SOLUTION, CONCENTRATE INTRAVENOUS at 08:58

## 2023-10-18 RX ADMIN — FENTANYL CITRATE 50 MCG: 50 INJECTION INTRAMUSCULAR; INTRAVENOUS at 08:40

## 2023-10-18 RX ADMIN — PROPOFOL 50 MG: 10 INJECTION, EMULSION INTRAVENOUS at 08:58

## 2023-10-18 RX ADMIN — ROPIVACAINE HYDROCHLORIDE 30 ML: 5 INJECTION, SOLUTION EPIDURAL; INFILTRATION; PERINEURAL at 08:40

## 2023-10-18 RX ADMIN — PROPOFOL 50 MG: 10 INJECTION, EMULSION INTRAVENOUS at 09:04

## 2023-10-18 ASSESSMENT — PAIN - FUNCTIONAL ASSESSMENT: PAIN_FUNCTIONAL_ASSESSMENT: NONE - DENIES PAIN

## 2023-10-18 ASSESSMENT — PAIN SCALES - GENERAL: PAINLEVEL_OUTOF10: 0

## 2023-10-18 NOTE — OP NOTE
instability of the nerve. Decision was made to proceed with transposition. The subcutaneous tissue was elevated off the fascia of the flexor pronator muscle mass. The medial intramuscular septum was excised. Bleeding was controlled. The nerve was carefully mobilized anteriorly. A subcutaneous pocket was developed by sowing the subcutaneous fat to the medial epicondyle with a 0 Vicryl. The elbow was taken through a full range of motion without kinking or instability of the nerve. The would was irrigated. The wound was then closed   with 4-0 nylon sutures. Sterile dressings were applied. A well padded well molded long arm splint was applied. The tourniquet was let down and brisk cap refill returned to the digits. She tolerated the entire procedure well without complications. KATI Tong assisted in this surgery by retracting soft tissues during the salient portions of the surgery and by finishing the closure of the wound.

## 2023-10-18 NOTE — ANESTHESIA POSTPROCEDURE EVALUATION
Department of Anesthesiology  Postprocedure Note    Patient: Nyla Feliz  MRN: [id number]  YOB: 1985  Date of evaluation: 10/18/2023      Procedure Summary     Date: 10/18/23 Room / Location: [facility] ASU A1 / [facility] AMBULATORY OR    Anesthesia Start: 0856 Anesthesia Stop: 5373    Procedure: LEFT ULNAR NERVE DECOMPRESSION (Left: Elbow) Diagnosis:       Cubital tunnel syndrome on left      (Cubital tunnel syndrome on left [G56.22])    Surgeons: Jenny Camargo MD Responsible Provider: Carolina Gipson DO    Anesthesia Type: MAC, Regional ASA Status: 2          Anesthesia Type: MAC, Regional    Rhea Phase I: Rhea Score: 10    Rhea Phase II:        Anesthesia Post Evaluation    Patient location during evaluation: bedside  Patient participation: complete - patient participated  Level of consciousness: awake and alert  Pain score: 0  Airway patency: patent  Nausea & Vomiting: no nausea and no vomiting  Complications: no  Cardiovascular status: blood pressure returned to baseline and hemodynamically stable  Respiratory status: room air  Hydration status: euvolemic  Multimodal analgesia pain management approach  Pain management: adequate

## 2023-10-18 NOTE — DISCHARGE INSTRUCTIONS
Dr. Wilkes Nip Upper Extremity Postoperative Instructions      1. Please maintain the dressing and splint placed at surgery until your follow up appointment where it will be removed. Please keep the dressing clean and dry. If you have any questions or problems, please call our office at [phone]. 2. Please elevate the operative extremity to the level of the heart to keep swelling at a minimum. You may get up to move around, but when seated please keep the extremity elevated as much as possible. This will decrease swelling and pain. 3. You were told to be non-weight bearing following surgery. Please do not lift anything heavier than 1 lb with your operative hand. 4. You may ice your Arm/Hand 5 times a day for 20 minutes at a time. 5. You had a block from the Anesthesiologist before surgery. Expect this to wear off around 12-24 hours after you received it. You should start taking your pain medication before the feeling begins to come back into your arm/ hand. 6. A prescription for pain medication is provided. The key to pain control is staying ahead of the pain. For the first 48-72 hours after your surgery, you may want to take your pain medication on a regular schedule. After that, you may only need it on an as needed basis. 7. If you experience any redness, increased pain, increased swelling not relieved by elevation, drainage, fever, or chills, please call the office at [phone]. Please Follow-up at my office [address], [address] in 2 weeks unless otherwise directed.

## 2023-10-18 NOTE — H&P
ASSESSMENT:  1. Cubital tunnel syndrome on left              Patient Active Problem List   Diagnosis    Sepsis due to urinary tract infection         PLAN:  Treatment Plan: We have discussed operative and non operative options. She has requested surgical management. Left ulnar nerve transposition under regional anesthesia. We discussed reasonable expectations. We discussed usual postop course. She has given informed consent to proceed. No orders of the defined types were placed in this encounter. Follow-up: Return for first postoperative visit. HISTORY OF PRESENT ILLNESS:  Chief Complaint: Follow-up of the Left Hand   Age: 41 y.o. Sex: female   History of present illness: Randa Sierra  is a pleasant 41 y.o. female who presents today for evaluation of her left upper extremity. She is recently status post ulnar wrist ganglion excision. Her complaint today is significant numbness and tingling in the ulnar nerve distribution. She reports that she has had mechanical symptoms on the medial side of the elbow for several years now. She has tried nighttime extension without much relief. Past medical history, past surgical history, medications, allergies, social history, and review of systems have been reviewed by me. No current facility-administered medications on file prior to encounter.      Current Outpatient Medications on File Prior to Encounter   Medication Sig Dispense Refill    cyclobenzaprine (FLEXERIL) 10 MG tablet Take 10 mg by mouth 3 times daily as needed (Patient not taking: Reported on 8/16/2023)      methocarbamol (ROBAXIN) 500 MG tablet Take 500 mg by mouth See Admin Instructions (Patient not taking: Reported on 8/16/2023)      sertraline (ZOLOFT) 50 MG tablet Take 50 mg by mouth daily (Patient not taking: Reported on 8/16/2023)         Allergies   Allergen Reactions    Amoxicillin Other (See Comments)     \"stomach pain\"    Hydrocodone Itching       Past Medical History:

## 2023-10-18 NOTE — INTERVAL H&P NOTE
Update History & Physical    The patient's History and Physical of October 18, 2023 was reviewed with the patient and I examined the patient. There was no change. The surgical site was confirmed by the patient and me. Plan: The risks, benefits, expected outcome, and alternative to the recommended procedure have been discussed with the patient. Patient understands and wants to proceed with the procedure.      Electronically signed by KATI Arthur on 10/18/2023 at 8:45 AM

## 2023-10-18 NOTE — ANESTHESIA PROCEDURE NOTES
Peripheral Block    Patient location during procedure: pre-op  Reason for block: post-op pain management and at surgeon's request  Start time: 10/18/2023 8:40 AM  End time: 10/18/2023 8:40 AM  Staffing  Performed: anesthesiologist   Anesthesiologist: Leydi Reyes DO  Performed by: Leydi Reyes DO  Authorized by: Leydi Reyes DO    Preanesthetic Checklist  Completed: patient identified, IV checked, site marked, risks and benefits discussed, surgical/procedural consents, equipment checked, pre-op evaluation, timeout performed, anesthesia consent given, oxygen available, monitors applied/VS acknowledged and fire risk safety assessment completed and verbalized  Peripheral Block   Patient position: supine  Prep: ChloraPrep  Provider prep: mask and sterile gloves  Patient monitoring: cardiac monitor, continuous pulse ox, frequent blood pressure checks, IV access and oxygen  Block type: Brachial plexus  Supraclavicular  Laterality: left  Injection technique: single-shot  Guidance: ultrasound guided    Needle   Needle type: insulated echogenic nerve stimulator needle   Needle gauge: 21 G  Needle localization: anatomical landmarks and ultrasound guidance  Needle length: 10 cm  Assessment   Injection assessment: negative aspiration for heme, no paresthesia on injection, local visualized surrounding nerve on ultrasound and no intravascular symptoms  Paresthesia pain: none  Slow fractionated injection: yes  Hemodynamics: stableno  Outcomes: uncomplicated    Medications Administered  ropivacaine (NAROPIN) injection 0.5% - Perineural   30 mL - 10/18/2023 8:40:00 AM

## 2024-02-07 ENCOUNTER — HOSPITAL ENCOUNTER (EMERGENCY)
Facility: HOSPITAL | Age: 39
Discharge: HOME | End: 2024-02-07
Attending: STUDENT IN AN ORGANIZED HEALTH CARE EDUCATION/TRAINING PROGRAM
Payer: MEDICAID

## 2024-02-07 VITALS
TEMPERATURE: 100.2 F | RESPIRATION RATE: 20 BRPM | BODY MASS INDEX: 23.07 KG/M2 | HEIGHT: 66 IN | OXYGEN SATURATION: 100 % | SYSTOLIC BLOOD PRESSURE: 123 MMHG | WEIGHT: 143.52 LBS | DIASTOLIC BLOOD PRESSURE: 73 MMHG | HEART RATE: 87 BPM

## 2024-02-07 DIAGNOSIS — B34.9 VIRAL SYNDROME: Primary | ICD-10-CM

## 2024-02-07 PROCEDURE — 99284 EMERGENCY DEPT VISIT MOD MDM: CPT

## 2024-02-07 PROCEDURE — 6360000002 HC RX W HCPCS: Performed by: STUDENT IN AN ORGANIZED HEALTH CARE EDUCATION/TRAINING PROGRAM

## 2024-02-07 PROCEDURE — 96372 THER/PROPH/DIAG INJ SC/IM: CPT

## 2024-02-07 RX ORDER — ACETAMINOPHEN 325 MG/1
650 TABLET ORAL EVERY 6 HOURS PRN
Qty: 40 TABLET | Refills: 0 | Status: SHIPPED | OUTPATIENT
Start: 2024-02-07

## 2024-02-07 RX ORDER — KETOROLAC TROMETHAMINE 30 MG/ML
30 INJECTION, SOLUTION INTRAMUSCULAR; INTRAVENOUS
Status: COMPLETED | OUTPATIENT
Start: 2024-02-07 | End: 2024-02-07

## 2024-02-07 RX ORDER — PSEUDOEPHEDRINE HCL 120 MG/1
120 TABLET, FILM COATED, EXTENDED RELEASE ORAL EVERY 12 HOURS PRN
Qty: 14 TABLET | Refills: 1 | Status: SHIPPED | OUTPATIENT
Start: 2024-02-07

## 2024-02-07 RX ORDER — OXYMETAZOLINE HYDROCHLORIDE 0.05 G/100ML
2 SPRAY NASAL 2 TIMES DAILY
Qty: 15 ML | Refills: 3 | Status: SHIPPED | OUTPATIENT
Start: 2024-02-07 | End: 2024-02-10

## 2024-02-07 RX ORDER — BENZONATATE 200 MG/1
200 CAPSULE ORAL 3 TIMES DAILY PRN
Qty: 30 CAPSULE | Refills: 0 | Status: SHIPPED | OUTPATIENT
Start: 2024-02-07

## 2024-02-07 RX ADMIN — KETOROLAC TROMETHAMINE 30 MG: 30 INJECTION, SOLUTION INTRAMUSCULAR; INTRAVENOUS at 17:43

## 2024-02-07 ASSESSMENT — PAIN DESCRIPTION - LOCATION: LOCATION: GENERALIZED

## 2024-02-07 ASSESSMENT — PAIN DESCRIPTION - DESCRIPTORS: DESCRIPTORS: ACHING

## 2024-02-07 ASSESSMENT — ENCOUNTER SYMPTOMS
SORE THROAT: 1
COUGH: 1
SHORTNESS OF BREATH: 0
ABDOMINAL PAIN: 0
EYE DISCHARGE: 0

## 2024-02-07 ASSESSMENT — PAIN - FUNCTIONAL ASSESSMENT: PAIN_FUNCTIONAL_ASSESSMENT: PREVENTS OR INTERFERES SOME ACTIVE ACTIVITIES AND ADLS

## 2024-02-07 ASSESSMENT — PAIN SCALES - GENERAL: PAINLEVEL_OUTOF10: 7

## 2024-02-07 NOTE — ED PROVIDER NOTES
[facility] EMERGENCY DEP  EMERGENCY DEPARTMENT ENCOUNTER      Pt Name: Tia Bowden  MRN: [id number]  Birthdate 1985  Date of evaluation: 2/7/2024  Provider: Chelsey Delgado PA-C    CHIEF COMPLAINT       Chief Complaint   Patient presents with    Generalized Body Aches    Nasal Congestion         HISTORY OF PRESENT ILLNESS    HPI  Patient is a 38 y.o. female who presents today with complaints of myalgias, headache, sore throat, cough. +flu exposure. Symptoms started yesterday/ Has been taking Dayquil for symptoms but started itching and vomited after she took it. Eating and drinking. Denies any change in voice, difficulty handling secretions, no facial or oral swelling.  Denies any syncope, seizure, focal weakness.  Denies any difficulty breathing, choking, wheezing, dyspnea.  Denies fever, abdominal pain, nausea, vomiting, dysuria.        Nursing Notes were reviewed.  g    REVIEW OF SYSTEMS       Review of Systems   Constitutional:  Negative for fever.   HENT:  Positive for sore throat. Negative for congestion.    Eyes:  Negative for discharge.   Respiratory:  Positive for cough. Negative for shortness of breath.    Cardiovascular:  Negative for chest pain.   Gastrointestinal:  Negative for abdominal pain.   Genitourinary:  Negative for dysuria.   Musculoskeletal:  Positive for myalgias.   Skin:  Negative for wound.   Neurological:  Positive for headaches. Negative for seizures.   Psychiatric/Behavioral:  Negative for suicidal ideas.        Except as noted above the remainder of the review of systems was reviewed and negative.       PAST MEDICAL HISTORY     Past Medical History:   Diagnosis Date    Anxiety     Psychiatric disorder          SURGICAL HISTORY       Past Surgical History:   Procedure Laterality Date    ARM SURGERY Left 10/18/2023    LEFT ULNAR NERVE DECOMPRESSION performed by Deangelo Brennan MD at [facility] AMBULATORY OR    CYST REMOVAL Left 8/16/2023    EXCISION LEFT WRIST GANGLION performed by

## 2024-02-07 NOTE — ED NOTES
Discharge paperwork reviewed with pt & questions answered by KATI Barbosa. Pt walked off unit in no apparent acute distress.

## 2024-02-07 NOTE — ED TRIAGE NOTES
Pt ambulatory to ED w/ c/o generalized body aches, cough, congestion, chest pressure. Pt exposed to flu over the weekend.

## 2024-02-07 NOTE — DISCHARGE INSTRUCTIONS
You have a viral illness like the Flu.  The treatment for this is supportive care with over the counter medications for your symptoms. Be sure to increase your fluid intake and get plenty of rest. Stay away from others to control the spread of the virus and stay home until you are fever free for 24 hours without the use of medication.     If for any reason your symptoms worsen please follow-up with your primary care provider.  If you have a high fever that is not relieved by over-the-counter fever reducing medications, you cannot keep liquids down, or you develop any difficulty breathing please come back to the emergency room for reevaluation.

## 2024-05-17 ENCOUNTER — HOSPITAL ENCOUNTER (OUTPATIENT)
Facility: HOSPITAL | Age: 39
End: 2024-05-17
Attending: ORTHOPAEDIC SURGERY
Payer: MEDICAID

## 2024-05-17 DIAGNOSIS — M77.02 MEDIAL EPICONDYLITIS OF LEFT ELBOW: ICD-10-CM

## 2024-05-17 PROCEDURE — 73221 MRI JOINT UPR EXTREM W/O DYE: CPT

## 2024-10-21 ENCOUNTER — HOSPITAL ENCOUNTER (EMERGENCY)
Facility: HOSPITAL | Age: 39
Discharge: HOME | End: 2024-10-21
Attending: STUDENT IN AN ORGANIZED HEALTH CARE EDUCATION/TRAINING PROGRAM
Payer: MEDICAID

## 2024-10-21 ENCOUNTER — APPOINTMENT (OUTPATIENT)
Facility: HOSPITAL | Age: 39
End: 2024-10-21
Payer: MEDICAID

## 2024-10-21 VITALS
WEIGHT: 152.56 LBS | BODY MASS INDEX: 24.62 KG/M2 | OXYGEN SATURATION: 100 % | SYSTOLIC BLOOD PRESSURE: 120 MMHG | HEART RATE: 74 BPM | RESPIRATION RATE: 16 BRPM | DIASTOLIC BLOOD PRESSURE: 77 MMHG | TEMPERATURE: 98.5 F

## 2024-10-21 DIAGNOSIS — R51.9 NONINTRACTABLE HEADACHE, UNSPECIFIED CHRONICITY PATTERN, UNSPECIFIED HEADACHE TYPE: ICD-10-CM

## 2024-10-21 DIAGNOSIS — N83.202 LEFT OVARIAN CYST: Primary | ICD-10-CM

## 2024-10-21 LAB
ALBUMIN SERPL-MCNC: 4.8 G/DL (ref 3.5–5)
ALBUMIN/GLOB SERPL: 1 (ref 1.1–2.2)
ALP SERPL-CCNC: 61 U/L (ref 45–117)
ALT SERPL-CCNC: 14 U/L (ref 12–78)
ANION GAP SERPL CALC-SCNC: 1 MMOL/L (ref 2–12)
APPEARANCE UR: CLEAR
AST SERPL-CCNC: 13 U/L (ref 15–37)
BACTERIA URNS QL MICRO: ABNORMAL /HPF
BASOPHILS # BLD: 0 K/UL (ref 0–0.1)
BASOPHILS NFR BLD: 1 % (ref 0–1)
BILIRUB SERPL-MCNC: 1.1 MG/DL (ref 0.2–1)
BILIRUB UR QL: NEGATIVE
BUN SERPL-MCNC: 13 MG/DL (ref 6–20)
BUN/CREAT SERPL: 14 (ref 12–20)
CALCIUM SERPL-MCNC: 9.7 MG/DL (ref 8.5–10.1)
CHLORIDE SERPL-SCNC: 105 MMOL/L (ref 97–108)
CO2 SERPL-SCNC: 28 MMOL/L (ref 21–32)
COLOR UR: ABNORMAL
CREAT SERPL-MCNC: 0.92 MG/DL (ref 0.55–1.02)
DIFFERENTIAL METHOD BLD: NORMAL
EOSINOPHIL # BLD: 0.1 K/UL (ref 0–0.4)
EOSINOPHIL NFR BLD: 1 % (ref 0–7)
EPITH CASTS URNS QL MICRO: ABNORMAL /LPF
ERYTHROCYTE [DISTWIDTH] IN BLOOD BY AUTOMATED COUNT: 13 % (ref 11.5–14.5)
GLOBULIN SER CALC-MCNC: 4.7 G/DL (ref 2–4)
GLUCOSE SERPL-MCNC: 86 MG/DL (ref 65–100)
GLUCOSE UR STRIP.AUTO-MCNC: NEGATIVE MG/DL
HCG UR QL: NEGATIVE
HCT VFR BLD AUTO: 42.5 % (ref 35–47)
HGB BLD-MCNC: 14.1 G/DL (ref 11.5–16)
HGB UR QL STRIP: NEGATIVE
HYALINE CASTS URNS QL MICRO: ABNORMAL /LPF (ref 0–5)
IMM GRANULOCYTES # BLD AUTO: 0 K/UL (ref 0–0.04)
IMM GRANULOCYTES NFR BLD AUTO: 0 % (ref 0–0.5)
KETONES UR QL STRIP.AUTO: 80 MG/DL
LEUKOCYTE ESTERASE UR QL STRIP.AUTO: NEGATIVE
LIPASE SERPL-CCNC: 33 U/L (ref 13–75)
LYMPHOCYTES # BLD: 1.6 K/UL (ref 0.8–3.5)
LYMPHOCYTES NFR BLD: 37 % (ref 12–49)
MCH RBC QN AUTO: 29.7 PG (ref 26–34)
MCHC RBC AUTO-ENTMCNC: 33.2 G/DL (ref 30–36.5)
MCV RBC AUTO: 89.5 FL (ref 80–99)
MONOCYTES # BLD: 0.3 K/UL (ref 0–1)
MONOCYTES NFR BLD: 6 % (ref 5–13)
NEUTS SEG # BLD: 2.4 K/UL (ref 1.8–8)
NEUTS SEG NFR BLD: 55 % (ref 32–75)
NITRITE UR QL STRIP.AUTO: NEGATIVE
NRBC # BLD: 0 K/UL (ref 0–0.01)
NRBC BLD-RTO: 0 PER 100 WBC
PH UR STRIP: 6 (ref 5–8)
PLATELET # BLD AUTO: 197 K/UL (ref 150–400)
PMV BLD AUTO: 11.5 FL (ref 8.9–12.9)
POTASSIUM SERPL-SCNC: 4 MMOL/L (ref 3.5–5.1)
PROT SERPL-MCNC: 9.5 G/DL (ref 6.4–8.2)
PROT UR STRIP-MCNC: NEGATIVE MG/DL
RBC # BLD AUTO: 4.75 M/UL (ref 3.8–5.2)
RBC #/AREA URNS HPF: ABNORMAL /HPF (ref 0–5)
SODIUM SERPL-SCNC: 134 MMOL/L (ref 136–145)
SP GR UR REFRACTOMETRY: 1.03 (ref 1–1.03)
SPECIMEN HOLD: NORMAL
UROBILINOGEN UR QL STRIP.AUTO: 1 EU/DL (ref 0.2–1)
WBC # BLD AUTO: 4.4 K/UL (ref 3.6–11)
WBC URNS QL MICRO: ABNORMAL /HPF (ref 0–4)

## 2024-10-21 PROCEDURE — 74177 CT ABD & PELVIS W/CONTRAST: CPT

## 2024-10-21 PROCEDURE — 6360000004 HC RX CONTRAST MEDICATION: Performed by: STUDENT IN AN ORGANIZED HEALTH CARE EDUCATION/TRAINING PROGRAM

## 2024-10-21 PROCEDURE — 6360000002 HC RX W HCPCS: Performed by: STUDENT IN AN ORGANIZED HEALTH CARE EDUCATION/TRAINING PROGRAM

## 2024-10-21 PROCEDURE — 76830 TRANSVAGINAL US NON-OB: CPT

## 2024-10-21 PROCEDURE — 81025 URINE PREGNANCY TEST: CPT

## 2024-10-21 PROCEDURE — 2580000003 HC RX 258: Performed by: STUDENT IN AN ORGANIZED HEALTH CARE EDUCATION/TRAINING PROGRAM

## 2024-10-21 PROCEDURE — 76856 US EXAM PELVIC COMPLETE: CPT

## 2024-10-21 PROCEDURE — 36415 COLL VENOUS BLD VENIPUNCTURE: CPT

## 2024-10-21 PROCEDURE — 96375 TX/PRO/DX INJ NEW DRUG ADDON: CPT

## 2024-10-21 PROCEDURE — 80053 COMPREHEN METABOLIC PANEL: CPT

## 2024-10-21 PROCEDURE — 85025 COMPLETE CBC W/AUTO DIFF WBC: CPT

## 2024-10-21 PROCEDURE — 99285 EMERGENCY DEPT VISIT HI MDM: CPT

## 2024-10-21 PROCEDURE — 96374 THER/PROPH/DIAG INJ IV PUSH: CPT

## 2024-10-21 PROCEDURE — 6370000000 HC RX 637 (ALT 250 FOR IP): Performed by: STUDENT IN AN ORGANIZED HEALTH CARE EDUCATION/TRAINING PROGRAM

## 2024-10-21 PROCEDURE — 83690 ASSAY OF LIPASE: CPT

## 2024-10-21 PROCEDURE — 81001 URINALYSIS AUTO W/SCOPE: CPT

## 2024-10-21 RX ORDER — PROCHLORPERAZINE EDISYLATE 5 MG/ML
5 INJECTION INTRAMUSCULAR; INTRAVENOUS ONCE
Status: COMPLETED | OUTPATIENT
Start: 2024-10-21 | End: 2024-10-21

## 2024-10-21 RX ORDER — KETOROLAC TROMETHAMINE 30 MG/ML
15 INJECTION, SOLUTION INTRAMUSCULAR; INTRAVENOUS
Status: COMPLETED | OUTPATIENT
Start: 2024-10-21 | End: 2024-10-21

## 2024-10-21 RX ORDER — IOPAMIDOL 755 MG/ML
100 INJECTION, SOLUTION INTRAVASCULAR
Status: COMPLETED | OUTPATIENT
Start: 2024-10-21 | End: 2024-10-21

## 2024-10-21 RX ORDER — ACETAMINOPHEN 500 MG
1000 TABLET ORAL ONCE
Status: COMPLETED | OUTPATIENT
Start: 2024-10-21 | End: 2024-10-21

## 2024-10-21 RX ORDER — DIPHENHYDRAMINE HYDROCHLORIDE 50 MG/ML
25 INJECTION INTRAMUSCULAR; INTRAVENOUS ONCE
Status: COMPLETED | OUTPATIENT
Start: 2024-10-21 | End: 2024-10-21

## 2024-10-21 RX ADMIN — WATER 125 MG: 1 INJECTION INTRAMUSCULAR; INTRAVENOUS; SUBCUTANEOUS at 13:18

## 2024-10-21 RX ADMIN — IOPAMIDOL 100 ML: 755 INJECTION, SOLUTION INTRAVENOUS at 15:33

## 2024-10-21 RX ADMIN — KETOROLAC TROMETHAMINE 15 MG: 30 INJECTION, SOLUTION INTRAMUSCULAR at 13:14

## 2024-10-21 RX ADMIN — DIPHENHYDRAMINE HYDROCHLORIDE 25 MG: 50 INJECTION INTRAMUSCULAR; INTRAVENOUS at 13:16

## 2024-10-21 RX ADMIN — PROCHLORPERAZINE EDISYLATE 5 MG: 5 INJECTION INTRAMUSCULAR; INTRAVENOUS at 13:17

## 2024-10-21 RX ADMIN — ACETAMINOPHEN 1000 MG: 500 TABLET ORAL at 13:13

## 2024-10-21 ASSESSMENT — PAIN DESCRIPTION - DESCRIPTORS
DESCRIPTORS: OTHER (COMMENT)
DESCRIPTORS: THROBBING

## 2024-10-21 ASSESSMENT — PAIN DESCRIPTION - LOCATION
LOCATION: ABDOMEN;BACK;HEAD
LOCATION: HEAD

## 2024-10-21 ASSESSMENT — PAIN SCALES - GENERAL
PAINLEVEL_OUTOF10: 7
PAINLEVEL_OUTOF10: 7

## 2024-10-21 ASSESSMENT — PAIN - FUNCTIONAL ASSESSMENT
PAIN_FUNCTIONAL_ASSESSMENT: PREVENTS OR INTERFERES SOME ACTIVE ACTIVITIES AND ADLS
PAIN_FUNCTIONAL_ASSESSMENT: 0-10

## 2024-10-21 ASSESSMENT — PAIN DESCRIPTION - ORIENTATION: ORIENTATION: LEFT

## 2024-10-21 NOTE — ED TRIAGE NOTES
Pt c/o left flank pain radiating to abd for awhile, her mother stated her eyes are yellow, no jaundice noted in triage , denies fever, sweating a lot, denies n/v, denies urinary symptoms , +headache

## 2024-10-21 NOTE — ED PROVIDER NOTES
[facility] EMERGENCY DEP  EMERGENCY DEPARTMENT ENCOUNTER      Pt Name: Tia Bowden  MRN: [id number]  Birthdate 1985  Date of evaluation: 10/21/2024  Provider: Alissa Ayers DO    CHIEF COMPLAINT       Chief Complaint   Patient presents with    Flank Pain       PMH   Past Medical History:   Diagnosis Date    Anxiety     Psychiatric disorder          MDM:   Vitals:    Vitals:    10/21/24 1140   BP: (!) 155/93   Pulse: 83   Resp: 16   Temp: 99.1 °F (37.3 °C)   SpO2: 98%           This is a 39 y.o. female with pmhx anxiety, psychiatric disorder, migraines who presents today for cc of multiple complaints. Patient states that her mother, who is a nurse, felt that her eyes looked yellowish today and sent her in for evaluation. Patient states coincidentally she has been having several months of left sided flank pain, sometimes radiating to the front, 4/10 on the pain scale. No nausea, vomiting, urinary symptoms, changes in bowel habits, chest pain, dyspnea, fever, or chills. She also noted a headache that started 2 weeks ago like her typical headaches, 7/10 on the pain scale, not thunderclap in onset nor worst of her life. Took tylenol yesterday and motrin early this morning. Denies any neck pain or stiffness, no photophobia.     On arrival VS stable.   Physical Exam  General: Alert, no acute distress  HEENT: Normocephalic, atraumatic. EOMI, moist oral mucosa, no conjunctival injection.  Specifically no jaundice, no scleral icterus.  Neck: ROM normal, supple, can touch chin to chest  Cardio: Heart regular rate and rhythm, cap refill <2seconds  Lungs: No respiratory distress, no wheezing, CTAB  Abdomen: Soft, minimally tender left flank, LLQ without rebound or guarding  MSK: ROM normal, no LE edema  Skin: Warm, dry, no rash  Neuro: No focal neurodeficits, Aox3, coordinated gait    Neither the nurse nor I appreciate any scleral icterus no jaundice.  Patient herself states that she really does not see it.  Patient

## 2024-11-20 ENCOUNTER — HOSPITAL ENCOUNTER (OUTPATIENT)
Facility: HOSPITAL | Age: 39
Discharge: HOME | End: 2024-11-23

## 2024-11-20 VITALS
DIASTOLIC BLOOD PRESSURE: 81 MMHG | TEMPERATURE: 98.5 F | SYSTOLIC BLOOD PRESSURE: 127 MMHG | HEART RATE: 76 BPM | WEIGHT: 150 LBS | BODY MASS INDEX: 24.11 KG/M2 | HEIGHT: 66 IN | RESPIRATION RATE: 18 BRPM

## 2024-11-20 NOTE — PERIOP NOTE
INSTRUCTIONS GIVEN AND REVIEWED, 2 BOTTLES OF SOAP GIVEN, PT GIVEN TIME TO ASK QUESTIONS       
STOP 11/20/24    Other: NONE    (Pain medications not listed above, including Tylenol may be taken up until 4 hours prior to arrival time)   Blood  Thinners If you take Aspirin, Plavix, Coumadin, or any blood-thinning or anti-blood clot medicine, talk to the doctor who prescribed the medications for pre-operative instructions.    If you take aspirin or aspirin containing products for pain, stop 7 days prior to surgery STOP ON 11/20/24     Bathing Clothing  Jewelry  Valuables     When you shower the morning of surgery, please do not apply anything to your skin (lotions, powders, deodorant (if having breast surgery), or makeup (especially mascara). Remove fingernail polish except for clear.  Do not shave or trim anywhere 24 hours before surgery  Wear your hair loose or down; no pony-tails, buns, or metal hair clips  Wear loose, comfortable, clean clothes  Wear glasses instead of contacts. Bring a case to keep your glasses safe.  Leave money, valuables, and jewelry, including body piercings, at home  If you use inhalers or CPAP machine, bring them with you the day of surgery.     Going Home - or Spending the Night OUTPATIENT SURGERY: You must have a responsible adult drive you home and stay with you 24 hours after surgery. You may not drive for at least 24 hours after surgery.    ADMITS: If your doctor is keeping you in the hospital after surgery, leave personal belongings/luggage in your car until you have a hospital room number.    Hospital discharge time is 12 noon  Drivers must be here before 12 noon unless you are told differently   Special Instructions Free  parking available from 6 AM until 4:30 PM.    OTHER: FOLLOW ALL INSTRUCTIONS GIVEN BY SURGEONS OFFICE        Preventing Infections Before and After - Your Surgery    IMPORTANT INSTRUCTIONS      You play an important role in your health and preparation for surgery. To reduce the germs on your skin you will need to shower with CHG soap (Chorhexidine

## 2024-11-26 ENCOUNTER — ANESTHESIA EVENT (OUTPATIENT)
Facility: HOSPITAL | Age: 39
End: 2024-11-26
Payer: MEDICAID

## 2024-11-27 ENCOUNTER — ANESTHESIA (OUTPATIENT)
Facility: HOSPITAL | Age: 39
End: 2024-11-27
Payer: MEDICAID

## 2024-11-27 ENCOUNTER — HOSPITAL ENCOUNTER (OUTPATIENT)
Facility: HOSPITAL | Age: 39
Setting detail: OBSERVATION
Discharge: HOME | End: 2024-11-28
Attending: SPECIALIST | Admitting: SPECIALIST
Payer: MEDICAID

## 2024-11-27 DIAGNOSIS — G89.18 POST-OP PAIN: Primary | ICD-10-CM

## 2024-11-27 PROBLEM — Z90.710 H/O TOTAL VAGINAL HYSTERECTOMY: Status: ACTIVE | Noted: 2024-11-27

## 2024-11-27 LAB — HCG UR QL: NEGATIVE

## 2024-11-27 PROCEDURE — 6360000002 HC RX W HCPCS: Performed by: STUDENT IN AN ORGANIZED HEALTH CARE EDUCATION/TRAINING PROGRAM

## 2024-11-27 PROCEDURE — 2709999900 HC NON-CHARGEABLE SUPPLY: Performed by: SPECIALIST

## 2024-11-27 PROCEDURE — 81025 URINE PREGNANCY TEST: CPT

## 2024-11-27 PROCEDURE — 88307 TISSUE EXAM BY PATHOLOGIST: CPT

## 2024-11-27 PROCEDURE — 6360000002 HC RX W HCPCS: Performed by: SPECIALIST

## 2024-11-27 PROCEDURE — 7100000000 HC PACU RECOVERY - FIRST 15 MIN: Performed by: SPECIALIST

## 2024-11-27 PROCEDURE — G0378 HOSPITAL OBSERVATION PER HR: HCPCS

## 2024-11-27 PROCEDURE — 3700000001 HC ADD 15 MINUTES (ANESTHESIA): Performed by: SPECIALIST

## 2024-11-27 PROCEDURE — 7100000001 HC PACU RECOVERY - ADDTL 15 MIN: Performed by: SPECIALIST

## 2024-11-27 PROCEDURE — 2500000003 HC RX 250 WO HCPCS: Performed by: NURSE ANESTHETIST, CERTIFIED REGISTERED

## 2024-11-27 PROCEDURE — 2580000003 HC RX 258: Performed by: SPECIALIST

## 2024-11-27 PROCEDURE — 2580000003 HC RX 258: Performed by: STUDENT IN AN ORGANIZED HEALTH CARE EDUCATION/TRAINING PROGRAM

## 2024-11-27 PROCEDURE — S2900 ROBOTIC SURGICAL SYSTEM: HCPCS | Performed by: SPECIALIST

## 2024-11-27 PROCEDURE — 96372 THER/PROPH/DIAG INJ SC/IM: CPT

## 2024-11-27 PROCEDURE — 3600000009 HC SURGERY ROBOT BASE: Performed by: SPECIALIST

## 2024-11-27 PROCEDURE — 6370000000 HC RX 637 (ALT 250 FOR IP): Performed by: SPECIALIST

## 2024-11-27 PROCEDURE — 6360000002 HC RX W HCPCS: Performed by: NURSE ANESTHETIST, CERTIFIED REGISTERED

## 2024-11-27 PROCEDURE — 2720000010 HC SURG SUPPLY STERILE: Performed by: SPECIALIST

## 2024-11-27 PROCEDURE — 3700000000 HC ANESTHESIA ATTENDED CARE: Performed by: SPECIALIST

## 2024-11-27 PROCEDURE — 6370000000 HC RX 637 (ALT 250 FOR IP): Performed by: STUDENT IN AN ORGANIZED HEALTH CARE EDUCATION/TRAINING PROGRAM

## 2024-11-27 PROCEDURE — 3600000019 HC SURGERY ROBOT ADDTL 15MIN: Performed by: SPECIALIST

## 2024-11-27 RX ORDER — SUCCINYLCHOLINE/SOD CL,ISO/PF 200MG/10ML
SYRINGE (ML) INTRAVENOUS
Status: DISCONTINUED | OUTPATIENT
Start: 2024-11-27 | End: 2024-11-27 | Stop reason: SDUPTHER

## 2024-11-27 RX ORDER — LIDOCAINE HYDROCHLORIDE 20 MG/ML
INJECTION, SOLUTION EPIDURAL; INFILTRATION; INTRACAUDAL; PERINEURAL
Status: DISCONTINUED | OUTPATIENT
Start: 2024-11-27 | End: 2024-11-27 | Stop reason: SDUPTHER

## 2024-11-27 RX ORDER — HYDROMORPHONE HYDROCHLORIDE 1 MG/ML
0.5 INJECTION, SOLUTION INTRAMUSCULAR; INTRAVENOUS; SUBCUTANEOUS
Status: DISCONTINUED | OUTPATIENT
Start: 2024-11-27 | End: 2024-11-28 | Stop reason: HOSPADM

## 2024-11-27 RX ORDER — OXYCODONE HYDROCHLORIDE 5 MG/1
10 TABLET ORAL EVERY 4 HOURS PRN
Status: DISCONTINUED | OUTPATIENT
Start: 2024-11-27 | End: 2024-11-28 | Stop reason: HOSPADM

## 2024-11-27 RX ORDER — DEXAMETHASONE SODIUM PHOSPHATE 4 MG/ML
INJECTION, SOLUTION INTRA-ARTICULAR; INTRALESIONAL; INTRAMUSCULAR; INTRAVENOUS; SOFT TISSUE
Status: DISCONTINUED | OUTPATIENT
Start: 2024-11-27 | End: 2024-11-27 | Stop reason: SDUPTHER

## 2024-11-27 RX ORDER — PROPOFOL 10 MG/ML
INJECTION, EMULSION INTRAVENOUS
Status: DISCONTINUED | OUTPATIENT
Start: 2024-11-27 | End: 2024-11-27 | Stop reason: SDUPTHER

## 2024-11-27 RX ORDER — ONDANSETRON 2 MG/ML
INJECTION INTRAMUSCULAR; INTRAVENOUS
Status: DISCONTINUED | OUTPATIENT
Start: 2024-11-27 | End: 2024-11-27 | Stop reason: SDUPTHER

## 2024-11-27 RX ORDER — FENTANYL CITRATE 50 UG/ML
INJECTION, SOLUTION INTRAMUSCULAR; INTRAVENOUS
Status: DISCONTINUED | OUTPATIENT
Start: 2024-11-27 | End: 2024-11-27 | Stop reason: SDUPTHER

## 2024-11-27 RX ORDER — ACETAMINOPHEN 500 MG
1000 TABLET ORAL EVERY 8 HOURS SCHEDULED
Status: DISCONTINUED | OUTPATIENT
Start: 2024-11-27 | End: 2024-11-28 | Stop reason: HOSPADM

## 2024-11-27 RX ORDER — HYDROMORPHONE HYDROCHLORIDE 1 MG/ML
1 INJECTION, SOLUTION INTRAMUSCULAR; INTRAVENOUS; SUBCUTANEOUS
Status: DISCONTINUED | OUTPATIENT
Start: 2024-11-27 | End: 2024-11-28 | Stop reason: HOSPADM

## 2024-11-27 RX ORDER — OXYCODONE HYDROCHLORIDE 5 MG/1
5 TABLET ORAL EVERY 4 HOURS PRN
Status: DISCONTINUED | OUTPATIENT
Start: 2024-11-27 | End: 2024-11-28 | Stop reason: HOSPADM

## 2024-11-27 RX ORDER — KETOROLAC TROMETHAMINE 30 MG/ML
30 INJECTION, SOLUTION INTRAMUSCULAR; INTRAVENOUS EVERY 6 HOURS
Status: DISPENSED | OUTPATIENT
Start: 2024-11-27 | End: 2024-11-28

## 2024-11-27 RX ORDER — ENOXAPARIN SODIUM 100 MG/ML
40 INJECTION SUBCUTANEOUS DAILY
Status: DISCONTINUED | OUTPATIENT
Start: 2024-11-27 | End: 2024-11-28 | Stop reason: HOSPADM

## 2024-11-27 RX ORDER — MIDAZOLAM HYDROCHLORIDE 1 MG/ML
INJECTION, SOLUTION INTRAMUSCULAR; INTRAVENOUS
Status: DISCONTINUED | OUTPATIENT
Start: 2024-11-27 | End: 2024-11-27 | Stop reason: SDUPTHER

## 2024-11-27 RX ORDER — HYDROMORPHONE HYDROCHLORIDE 1 MG/ML
INJECTION, SOLUTION INTRAMUSCULAR; INTRAVENOUS; SUBCUTANEOUS
Status: DISCONTINUED | OUTPATIENT
Start: 2024-11-27 | End: 2024-11-27 | Stop reason: SDUPTHER

## 2024-11-27 RX ORDER — IBUPROFEN 800 MG/1
800 TABLET, FILM COATED ORAL 2 TIMES DAILY PRN
Qty: 60 TABLET | Refills: 0 | Status: SHIPPED | OUTPATIENT
Start: 2024-11-27

## 2024-11-27 RX ORDER — SODIUM CHLORIDE, SODIUM LACTATE, POTASSIUM CHLORIDE, CALCIUM CHLORIDE 600; 310; 30; 20 MG/100ML; MG/100ML; MG/100ML; MG/100ML
INJECTION, SOLUTION INTRAVENOUS CONTINUOUS
Status: DISCONTINUED | OUTPATIENT
Start: 2024-11-27 | End: 2024-11-28 | Stop reason: HOSPADM

## 2024-11-27 RX ORDER — OXYCODONE HYDROCHLORIDE 5 MG/1
5 TABLET ORAL
Status: DISCONTINUED | OUTPATIENT
Start: 2024-11-27 | End: 2024-11-27 | Stop reason: HOSPADM

## 2024-11-27 RX ORDER — FLUMAZENIL 0.1 MG/ML
INJECTION INTRAVENOUS
Status: DISCONTINUED | OUTPATIENT
Start: 2024-11-27 | End: 2024-11-27 | Stop reason: SDUPTHER

## 2024-11-27 RX ORDER — IBUPROFEN 400 MG/1
800 TABLET, FILM COATED ORAL EVERY 8 HOURS
Status: DISCONTINUED | OUTPATIENT
Start: 2024-11-28 | End: 2024-11-28 | Stop reason: HOSPADM

## 2024-11-27 RX ORDER — FLUMAZENIL 0.1 MG/ML
INJECTION INTRAVENOUS
Status: COMPLETED
Start: 2024-11-27 | End: 2024-11-27

## 2024-11-27 RX ORDER — SODIUM CHLORIDE 9 MG/ML
INJECTION, SOLUTION INTRAVENOUS PRN
Status: DISCONTINUED | OUTPATIENT
Start: 2024-11-27 | End: 2024-11-27 | Stop reason: HOSPADM

## 2024-11-27 RX ORDER — HYDRALAZINE HYDROCHLORIDE 20 MG/ML
10 INJECTION INTRAMUSCULAR; INTRAVENOUS ONCE
Status: DISCONTINUED | OUTPATIENT
Start: 2024-11-27 | End: 2024-11-27 | Stop reason: HOSPADM

## 2024-11-27 RX ORDER — SODIUM CHLORIDE 0.9 % (FLUSH) 0.9 %
5-40 SYRINGE (ML) INJECTION PRN
Status: DISCONTINUED | OUTPATIENT
Start: 2024-11-27 | End: 2024-11-27 | Stop reason: HOSPADM

## 2024-11-27 RX ORDER — ACETAMINOPHEN 500 MG
1000 TABLET ORAL ONCE
Status: COMPLETED | OUTPATIENT
Start: 2024-11-27 | End: 2024-11-27

## 2024-11-27 RX ORDER — PROCHLORPERAZINE EDISYLATE 5 MG/ML
10 INJECTION INTRAMUSCULAR; INTRAVENOUS EVERY 6 HOURS PRN
Status: DISCONTINUED | OUTPATIENT
Start: 2024-11-27 | End: 2024-11-28 | Stop reason: HOSPADM

## 2024-11-27 RX ORDER — NALOXONE HYDROCHLORIDE 0.4 MG/ML
INJECTION, SOLUTION INTRAMUSCULAR; INTRAVENOUS; SUBCUTANEOUS PRN
Status: DISCONTINUED | OUTPATIENT
Start: 2024-11-27 | End: 2024-11-27 | Stop reason: HOSPADM

## 2024-11-27 RX ORDER — MIDAZOLAM HYDROCHLORIDE 2 MG/2ML
2 INJECTION, SOLUTION INTRAMUSCULAR; INTRAVENOUS PRN
Status: DISCONTINUED | OUTPATIENT
Start: 2024-11-27 | End: 2024-11-27 | Stop reason: HOSPADM

## 2024-11-27 RX ORDER — OXYCODONE AND ACETAMINOPHEN 5; 325 MG/1; MG/1
1 TABLET ORAL EVERY 6 HOURS PRN
Qty: 20 TABLET | Refills: 0 | Status: SHIPPED | OUTPATIENT
Start: 2024-11-27 | End: 2024-12-02

## 2024-11-27 RX ORDER — SODIUM CHLORIDE 0.9 % (FLUSH) 0.9 %
5-40 SYRINGE (ML) INJECTION EVERY 12 HOURS SCHEDULED
Status: DISCONTINUED | OUTPATIENT
Start: 2024-11-27 | End: 2024-11-27 | Stop reason: HOSPADM

## 2024-11-27 RX ORDER — DOCUSATE SODIUM 100 MG/1
100 CAPSULE, LIQUID FILLED ORAL 2 TIMES DAILY
Status: DISCONTINUED | OUTPATIENT
Start: 2024-11-27 | End: 2024-11-28 | Stop reason: HOSPADM

## 2024-11-27 RX ORDER — LIDOCAINE HYDROCHLORIDE 10 MG/ML
1 INJECTION, SOLUTION EPIDURAL; INFILTRATION; INTRACAUDAL; PERINEURAL
Status: DISCONTINUED | OUTPATIENT
Start: 2024-11-27 | End: 2024-11-27 | Stop reason: HOSPADM

## 2024-11-27 RX ORDER — BUPIVACAINE HYDROCHLORIDE 5 MG/ML
INJECTION, SOLUTION EPIDURAL; INTRACAUDAL PRN
Status: DISCONTINUED | OUTPATIENT
Start: 2024-11-27 | End: 2024-11-27 | Stop reason: HOSPADM

## 2024-11-27 RX ORDER — ONDANSETRON 2 MG/ML
4 INJECTION INTRAMUSCULAR; INTRAVENOUS EVERY 6 HOURS PRN
Status: DISCONTINUED | OUTPATIENT
Start: 2024-11-27 | End: 2024-11-28 | Stop reason: HOSPADM

## 2024-11-27 RX ORDER — SODIUM CHLORIDE 0.9 % (FLUSH) 0.9 %
5-40 SYRINGE (ML) INJECTION PRN
Status: DISCONTINUED | OUTPATIENT
Start: 2024-11-27 | End: 2024-11-28 | Stop reason: HOSPADM

## 2024-11-27 RX ORDER — ONDANSETRON 4 MG/1
4 TABLET, ORALLY DISINTEGRATING ORAL EVERY 8 HOURS PRN
Status: DISCONTINUED | OUTPATIENT
Start: 2024-11-27 | End: 2024-11-28 | Stop reason: HOSPADM

## 2024-11-27 RX ORDER — PROCHLORPERAZINE EDISYLATE 5 MG/ML
5 INJECTION INTRAMUSCULAR; INTRAVENOUS
Status: DISCONTINUED | OUTPATIENT
Start: 2024-11-27 | End: 2024-11-27 | Stop reason: HOSPADM

## 2024-11-27 RX ORDER — SODIUM CHLORIDE 0.9 % (FLUSH) 0.9 %
5-40 SYRINGE (ML) INJECTION EVERY 12 HOURS SCHEDULED
Status: DISCONTINUED | OUTPATIENT
Start: 2024-11-27 | End: 2024-11-28 | Stop reason: HOSPADM

## 2024-11-27 RX ORDER — KETOROLAC TROMETHAMINE 30 MG/ML
INJECTION, SOLUTION INTRAMUSCULAR; INTRAVENOUS
Status: DISCONTINUED | OUTPATIENT
Start: 2024-11-27 | End: 2024-11-27 | Stop reason: SDUPTHER

## 2024-11-27 RX ORDER — HYDROMORPHONE HYDROCHLORIDE 1 MG/ML
0.5 INJECTION, SOLUTION INTRAMUSCULAR; INTRAVENOUS; SUBCUTANEOUS EVERY 5 MIN PRN
Status: DISCONTINUED | OUTPATIENT
Start: 2024-11-27 | End: 2024-11-27 | Stop reason: HOSPADM

## 2024-11-27 RX ORDER — GLYCOPYRROLATE 0.2 MG/ML
INJECTION INTRAMUSCULAR; INTRAVENOUS
Status: DISCONTINUED | OUTPATIENT
Start: 2024-11-27 | End: 2024-11-27 | Stop reason: SDUPTHER

## 2024-11-27 RX ORDER — SODIUM CHLORIDE, SODIUM LACTATE, POTASSIUM CHLORIDE, CALCIUM CHLORIDE 600; 310; 30; 20 MG/100ML; MG/100ML; MG/100ML; MG/100ML
INJECTION, SOLUTION INTRAVENOUS CONTINUOUS
Status: DISCONTINUED | OUTPATIENT
Start: 2024-11-27 | End: 2024-11-27 | Stop reason: HOSPADM

## 2024-11-27 RX ORDER — SODIUM CHLORIDE 9 MG/ML
INJECTION, SOLUTION INTRAVENOUS PRN
Status: DISCONTINUED | OUTPATIENT
Start: 2024-11-27 | End: 2024-11-28 | Stop reason: HOSPADM

## 2024-11-27 RX ORDER — ENOXAPARIN SODIUM 100 MG/ML
40 INJECTION SUBCUTANEOUS ONCE
Status: COMPLETED | OUTPATIENT
Start: 2024-11-27 | End: 2024-11-27

## 2024-11-27 RX ORDER — FENTANYL CITRATE 50 UG/ML
100 INJECTION, SOLUTION INTRAMUSCULAR; INTRAVENOUS
Status: DISCONTINUED | OUTPATIENT
Start: 2024-11-27 | End: 2024-11-27 | Stop reason: HOSPADM

## 2024-11-27 RX ORDER — METRONIDAZOLE 500 MG/100ML
500 INJECTION, SOLUTION INTRAVENOUS ONCE
Status: COMPLETED | OUTPATIENT
Start: 2024-11-27 | End: 2024-11-27

## 2024-11-27 RX ORDER — FENTANYL CITRATE 50 UG/ML
25 INJECTION, SOLUTION INTRAMUSCULAR; INTRAVENOUS EVERY 5 MIN PRN
Status: DISCONTINUED | OUTPATIENT
Start: 2024-11-27 | End: 2024-11-27 | Stop reason: HOSPADM

## 2024-11-27 RX ORDER — ROCURONIUM BROMIDE 10 MG/ML
INJECTION, SOLUTION INTRAVENOUS
Status: DISCONTINUED | OUTPATIENT
Start: 2024-11-27 | End: 2024-11-27 | Stop reason: SDUPTHER

## 2024-11-27 RX ORDER — ONDANSETRON 2 MG/ML
4 INJECTION INTRAMUSCULAR; INTRAVENOUS
Status: DISCONTINUED | OUTPATIENT
Start: 2024-11-27 | End: 2024-11-27 | Stop reason: HOSPADM

## 2024-11-27 RX ADMIN — PROPOFOL 150 MG: 10 INJECTION, EMULSION INTRAVENOUS at 07:31

## 2024-11-27 RX ADMIN — FENTANYL CITRATE 50 MCG: 50 INJECTION, SOLUTION INTRAMUSCULAR; INTRAVENOUS at 07:31

## 2024-11-27 RX ADMIN — ACETAMINOPHEN 1000 MG: 500 TABLET ORAL at 23:51

## 2024-11-27 RX ADMIN — ENOXAPARIN SODIUM 40 MG: 100 INJECTION SUBCUTANEOUS at 18:44

## 2024-11-27 RX ADMIN — FENTANYL CITRATE 25 MCG: 50 INJECTION INTRAMUSCULAR; INTRAVENOUS at 10:10

## 2024-11-27 RX ADMIN — ACETAMINOPHEN 1000 MG: 500 TABLET ORAL at 14:36

## 2024-11-27 RX ADMIN — DOCUSATE SODIUM 100 MG: 100 CAPSULE, LIQUID FILLED ORAL at 20:46

## 2024-11-27 RX ADMIN — LIDOCAINE HYDROCHLORIDE 60 MG: 20 INJECTION, SOLUTION EPIDURAL; INFILTRATION; INTRACAUDAL; PERINEURAL at 07:31

## 2024-11-27 RX ADMIN — KETOROLAC TROMETHAMINE 30 MG: 30 INJECTION, SOLUTION INTRAMUSCULAR at 14:38

## 2024-11-27 RX ADMIN — Medication 20 MG: at 07:44

## 2024-11-27 RX ADMIN — PROPOFOL 25 MG: 10 INJECTION, EMULSION INTRAVENOUS at 08:20

## 2024-11-27 RX ADMIN — GLYCOPYRROLATE 0.2 MG: 0.2 INJECTION INTRAMUSCULAR; INTRAVENOUS at 08:48

## 2024-11-27 RX ADMIN — FENTANYL CITRATE 25 MCG: 50 INJECTION INTRAMUSCULAR; INTRAVENOUS at 09:59

## 2024-11-27 RX ADMIN — PROPOFOL 25 MG: 10 INJECTION, EMULSION INTRAVENOUS at 07:57

## 2024-11-27 RX ADMIN — DEXAMETHASONE SODIUM PHOSPHATE 4 MG: 4 INJECTION, SOLUTION INTRAMUSCULAR; INTRAVENOUS at 07:50

## 2024-11-27 RX ADMIN — ONDANSETRON 4 MG: 2 INJECTION INTRAMUSCULAR; INTRAVENOUS at 08:24

## 2024-11-27 RX ADMIN — ROCURONIUM BROMIDE 10 MG: 10 INJECTION, SOLUTION INTRAVENOUS at 07:31

## 2024-11-27 RX ADMIN — KETOROLAC TROMETHAMINE 30 MG: 30 INJECTION, SOLUTION INTRAMUSCULAR at 20:46

## 2024-11-27 RX ADMIN — FENTANYL CITRATE 50 MCG: 50 INJECTION, SOLUTION INTRAMUSCULAR; INTRAVENOUS at 07:43

## 2024-11-27 RX ADMIN — KETOROLAC TROMETHAMINE 30 MG: 30 INJECTION, SOLUTION INTRAMUSCULAR at 08:23

## 2024-11-27 RX ADMIN — HYDROMORPHONE HYDROCHLORIDE 0.25 MG: 1 INJECTION, SOLUTION INTRAMUSCULAR; INTRAVENOUS; SUBCUTANEOUS at 09:17

## 2024-11-27 RX ADMIN — ROCURONIUM BROMIDE 20 MG: 10 INJECTION, SOLUTION INTRAVENOUS at 07:38

## 2024-11-27 RX ADMIN — HYDROMORPHONE HYDROCHLORIDE 0.25 MG: 1 INJECTION, SOLUTION INTRAMUSCULAR; INTRAVENOUS; SUBCUTANEOUS at 09:13

## 2024-11-27 RX ADMIN — Medication 160 MG: at 07:31

## 2024-11-27 RX ADMIN — MIDAZOLAM HYDROCHLORIDE 2 MG: 1 INJECTION, SOLUTION INTRAMUSCULAR; INTRAVENOUS at 07:27

## 2024-11-27 RX ADMIN — METRONIDAZOLE 500 MG: 5 INJECTION, SOLUTION INTRAVENOUS at 07:35

## 2024-11-27 RX ADMIN — PROPOFOL 25 MG: 10 INJECTION, EMULSION INTRAVENOUS at 08:23

## 2024-11-27 RX ADMIN — PROPOFOL 25 MG: 10 INJECTION, EMULSION INTRAVENOUS at 08:00

## 2024-11-27 RX ADMIN — FLUMAZENIL 0.2 MG: 0.1 INJECTION, SOLUTION INTRAVENOUS at 08:52

## 2024-11-27 RX ADMIN — PROPOFOL 25 MG: 10 INJECTION, EMULSION INTRAVENOUS at 08:26

## 2024-11-27 RX ADMIN — FENTANYL CITRATE 25 MCG: 50 INJECTION INTRAMUSCULAR; INTRAVENOUS at 10:47

## 2024-11-27 RX ADMIN — ACETAMINOPHEN 1000 MG: 500 TABLET ORAL at 06:59

## 2024-11-27 RX ADMIN — ENOXAPARIN SODIUM 40 MG: 100 INJECTION SUBCUTANEOUS at 06:59

## 2024-11-27 RX ADMIN — SODIUM CHLORIDE, POTASSIUM CHLORIDE, SODIUM LACTATE AND CALCIUM CHLORIDE: 600; 310; 30; 20 INJECTION, SOLUTION INTRAVENOUS at 07:10

## 2024-11-27 RX ADMIN — WATER 2000 MG: 1 INJECTION INTRAMUSCULAR; INTRAVENOUS; SUBCUTANEOUS at 07:40

## 2024-11-27 RX ADMIN — FLUMAZENIL 0.1 MG: 0.1 INJECTION, SOLUTION INTRAVENOUS at 09:03

## 2024-11-27 RX ADMIN — FLUMAZENIL 0.2 MG: 0.1 INJECTION, SOLUTION INTRAVENOUS at 08:59

## 2024-11-27 RX ADMIN — MIDAZOLAM HYDROCHLORIDE 3 MG: 1 INJECTION, SOLUTION INTRAMUSCULAR; INTRAVENOUS at 07:25

## 2024-11-27 RX ADMIN — SUGAMMADEX 200 MG: 100 INJECTION, SOLUTION INTRAVENOUS at 08:26

## 2024-11-27 ASSESSMENT — PAIN DESCRIPTION - LOCATION
LOCATION: ABDOMEN
LOCATION: ABDOMEN;INCISION
LOCATION: ABDOMEN
LOCATION: BACK
LOCATION: ABDOMEN;INCISION

## 2024-11-27 ASSESSMENT — PAIN DESCRIPTION - ONSET
ONSET: ON-GOING
ONSET: ON-GOING

## 2024-11-27 ASSESSMENT — PAIN - FUNCTIONAL ASSESSMENT
PAIN_FUNCTIONAL_ASSESSMENT: ACTIVITIES ARE NOT PREVENTED
PAIN_FUNCTIONAL_ASSESSMENT: 0-10

## 2024-11-27 ASSESSMENT — PAIN DESCRIPTION - ORIENTATION
ORIENTATION: ANTERIOR
ORIENTATION: ANTERIOR
ORIENTATION: LOWER
ORIENTATION: ANTERIOR

## 2024-11-27 ASSESSMENT — PAIN DESCRIPTION - DESCRIPTORS
DESCRIPTORS: SORE
DESCRIPTORS: ACHING;CRAMPING
DESCRIPTORS: ACHING
DESCRIPTORS: CRAMPING;ACHING
DESCRIPTORS: ACHING
DESCRIPTORS: CRAMPING;ACHING
DESCRIPTORS: ACHING

## 2024-11-27 ASSESSMENT — PAIN DESCRIPTION - FREQUENCY
FREQUENCY: INTERMITTENT
FREQUENCY: INTERMITTENT

## 2024-11-27 ASSESSMENT — PAIN SCALES - GENERAL
PAINLEVEL_OUTOF10: 10
PAINLEVEL_OUTOF10: 7
PAINLEVEL_OUTOF10: 7
PAINLEVEL_OUTOF10: 10
PAINLEVEL_OUTOF10: 7
PAINLEVEL_OUTOF10: 5
PAINLEVEL_OUTOF10: 3

## 2024-11-27 ASSESSMENT — PAIN DESCRIPTION - PAIN TYPE
TYPE: SURGICAL PAIN
TYPE: SURGICAL PAIN

## 2024-11-27 NOTE — DISCHARGE INSTRUCTIONS
Laparoscopic Hysterectomy: What to Expect at Home  Your Recovery     A laparoscopic hysterectomy is surgery to take out the uterus. Your doctor put a lighted tube and surgical tools through small cuts in your belly to remove the uterus. The cervix is usually removed too. In some cases, the ovaries and fallopian tubes also are taken out at the same time.  You can expect to feel better and stronger each day. But you might need pain medicine for a week or two. After a laparoscopy, you may have shoulder pain. This is caused by the air your doctor put in your belly to help see your organs better. The pain may last for a day or two. You may get tired easily or have less energy than usual. The tiredness may last for several weeks after surgery. And you also may have light vaginal bleeding for a few weeks.  It's important to avoid lifting while you are recovering so that you can heal. It may take about 4 to 6 weeks to fully recover. The recovery time may be shorter for some people.  This care sheet gives you a general idea about how long it will take for you to recover. But each person recovers at a different pace. Follow the steps below to get better as quickly as possible.  How can you care for yourself at home?  Activity    Rest when you feel tired. Getting enough sleep will help you recover.     Try to walk each day. Start by walking a little more than you did the day before. Bit by bit, increase the amount you walk. Walking boosts blood flow and helps prevent pneumonia and constipation.     Avoid lifting anything that would make you strain. This may include heavy grocery bags and milk containers, a heavy briefcase or backpack, bags of cat litter or dog food, a vacuum , or a child.     Avoid strenuous activities, such as biking, jogging, weight lifting, or aerobic exercise, until your doctor says it is okay.     Ask your doctor when you can drive again.     You may shower 24 to 48 hours after surgery, if  information.    ______________________________________________________________________    Anesthesia Discharge Instructions    After general anesthesia or intervenous sedation, for 24 hours or while taking prescription Narcotics:  Limit your activities  Do not drive or operate hazardous machinery  If you have not urinated within 8 hours after discharge, please contact your surgeon on call.  Do not make important personal or business decisions  Do not drink alcoholic beverages    Report the following to your surgeon:  Excessive pain, swelling, redness or odor of or around the surgical area  Temperature over 100.5 degrees  Nausea and vomiting lasting longer than 4 hours or if unable to take medication  Any signs of decreased circulation or nerve impairment to extremity:  Change in color, persistent numbness, tingling, coldness or increased pain.  Any questions

## 2024-11-27 NOTE — OP NOTE
[address]  [address]                            OPERATIVE REPORT      PATIENT NAME: SÁNCHEZ BAILEY            : 1985  MED REC NO: [id number]                       ROOM: OR  ACCOUNT NO: [id number]                       ADMIT DATE: 2024  PROVIDER: Christian Vazquez MD    DATE OF SERVICE:  2024    PREOPERATIVE DIAGNOSES:  Pelvic pain, postablation syndrome, bilateral ovarian cysts.    POSTOPERATIVE DIAGNOSES:  Pelvic pain, postablation syndrome, bilateral ovarian cysts.    PROCEDURES PERFORMED:  Da Chelly laparoscopic total with bilateral salpingectomy, bilateral ovarian cystectomy.    SURGEON:  Christian Vazquez MD    ASSISTANT:  Dr. Channing Duenas.    ANESTHESIA:  General.    ESTIMATED BLOOD LOSS:  Minimal.    SPECIMENS REMOVED:  Pathology; bilateral fallopian tubes, uterus and cervix.    INTRAOPERATIVE FINDINGS:  Uterus approximately 10 weeks in size.  Cystic bilateral fallopian tubes.  Simple bilateral ovarian cysts.  The left was 1 cm x 1 cm.  The right ovary was 1 x 2 cm.  Both were simple.     COMPLICATIONS:       IMPLANTS:  None.    INDICATIONS:       DESCRIPTION OF PROCEDURE:  After extensive counseling, risks and benefits of the procedure as well as discussion in the preop holding area in regard to if the ovaries were grossly normal, saving both ovaries, consent was signed.  She was taken to the operating room.  After adequate anesthesia, preoperative antibiotics, SCDs, Lovenox and a bowel prep.  She was placed in the dorsal lithotomy position, prepped and draped in the usual sterile manner for the surgical procedure.  Ochoa catheter was placed.  Clear urine was noted.  Sterile Graves speculum was inserted in the vagina.  Anterior lip of the cervix was grasped with a single-tooth tenaculum and the cervix was gently dilated to accept a medium VCare uterine manipulator.  Single-tooth tenaculum and Graves speculum were  without was injected subcu.  The patient was awoken in the operating room, taken to recovery in stable condition.        MD GILLIAN MARTÍNEZ/CHANDRAKANT  D:  11/27/2024 08:25:53  T:  11/27/2024 08:42:13  JOB #:  [id number]

## 2024-11-27 NOTE — ANESTHESIA POSTPROCEDURE EVALUATION
Department of Anesthesiology  Postprocedure Note    Post-Anesthesia Evaluation and Assessment    Patient: Tia Bowden MRN: [id number]  SSN: [id number]    YOB: 1985  Age: 39 y.o.  Sex: female      I have evaluated the patient and they are stable and ready for discharge from the PACU.     Cardiovascular Function/Vital Signs  Visit Vitals  /70   Pulse 62   Temp 98.6 °F (37 °C)   Resp 12   SpO2 100%       Patient is status post General anesthesia for Procedure(s):  ROBOTIC TOTAL LAPAROSCOPIC HYSTERECTOMY, BILATERAL SALPINGECTOMY, BILATERAL OVARIAN CYSTECTOMY.    Nausea/Vomiting: None    Postoperative hydration reviewed and adequate.    Pain:  Managed    Neurological Status:   At baseline    Mental Status, Level of Consciousness: Alert and  oriented to person, place, and time    Pulmonary Status:   Adequate oxygenation and airway patent    Complications related to anesthesia: None    Post-anesthesia assessment completed. No concerns    Signed By: Sindi Manuel DO     November 27, 2024

## 2024-11-27 NOTE — ANESTHESIA PRE PROCEDURE
Department of Anesthesiology  Preprocedure Note       Name:  Tia Bowden   Age:  39 y.o.  :  1985                                          MRN:  [id number]         Date:  2024      Surgeon: Surgeon(s):  Christian Vazquez MD    Procedure: Procedure(s):  ROBOTIC TOTAL LAPAROSCOPIC HYSTERECTOMY, BILATERAL SALPINGO-OOPHORECTOMY    Medications prior to admission:   Prior to Admission medications    Medication Sig Start Date End Date Taking? Authorizing Provider   acetaminophen (AMINOFEN) 325 MG tablet Take 2 tablets by mouth every 6 hours as needed for Pain 24  Yes Chelsey Delgado PA-C       Current medications:    Current Facility-Administered Medications   Medication Dose Route Frequency Provider Last Rate Last Admin    lidocaine PF 1 % injection 1 mL  1 mL IntraDERmal Once PRN Sindi Manuel, DO        fentaNYL (SUBLIMAZE) injection 100 mcg  100 mcg IntraVENous Once PRN Sindi Manuel, DO        lactated ringers infusion   IntraVENous Continuous Sindi Manuel, DO        sodium chloride flush 0.9 % injection 5-40 mL  5-40 mL IntraVENous 2 times per day Sindi Manuel, DO        sodium chloride flush 0.9 % injection 5-40 mL  5-40 mL IntraVENous PRN Sindi Manuel, DO        0.9 % sodium chloride infusion   IntraVENous PRN Sindi Manuel, DO        midazolam PF (VERSED) injection 2 mg  2 mg IntraVENous PRN Sindi Manuel, DO           Allergies:    Allergies   Allergen Reactions    Amoxicillin Other (See Comments)     \"stomach pain\"    Hydrocodone Hives and Itching       Problem List:    Patient Active Problem List   Diagnosis Code    Sepsis due to urinary tract infection (HCC) A41.9, N39.0    Pregnancy Z34.90       Past Medical History:        Diagnosis Date    Anxiety        Past Surgical History:        Procedure Laterality Date    ARM SURGERY Left 10/18/2023    LEFT ULNAR NERVE DECOMPRESSION performed by Deangelo Brennan MD at [facility] AMBULATORY OR    CYST REMOVAL Left 2023    EXCISION LEFT

## 2024-11-28 VITALS
SYSTOLIC BLOOD PRESSURE: 115 MMHG | OXYGEN SATURATION: 97 % | RESPIRATION RATE: 15 BRPM | DIASTOLIC BLOOD PRESSURE: 70 MMHG | HEART RATE: 60 BPM | TEMPERATURE: 98.8 F

## 2024-11-28 LAB
ANION GAP SERPL CALC-SCNC: 5 MMOL/L (ref 2–12)
BUN SERPL-MCNC: 10 MG/DL (ref 6–20)
BUN/CREAT SERPL: 13 (ref 12–20)
CALCIUM SERPL-MCNC: 8.6 MG/DL (ref 8.5–10.1)
CHLORIDE SERPL-SCNC: 110 MMOL/L (ref 97–108)
CO2 SERPL-SCNC: 24 MMOL/L (ref 21–32)
CREAT SERPL-MCNC: 0.8 MG/DL (ref 0.55–1.02)
ERYTHROCYTE [DISTWIDTH] IN BLOOD BY AUTOMATED COUNT: 13.1 % (ref 11.5–14.5)
GLUCOSE SERPL-MCNC: 103 MG/DL (ref 65–100)
HCT VFR BLD AUTO: 31.8 % (ref 35–47)
HGB BLD-MCNC: 10.8 G/DL (ref 11.5–16)
MCH RBC QN AUTO: 30.7 PG (ref 26–34)
MCHC RBC AUTO-ENTMCNC: 34 G/DL (ref 30–36.5)
MCV RBC AUTO: 90.3 FL (ref 80–99)
NRBC # BLD: 0 K/UL (ref 0–0.01)
NRBC BLD-RTO: 0 PER 100 WBC
PLATELET # BLD AUTO: 181 K/UL (ref 150–400)
PMV BLD AUTO: 11.7 FL (ref 8.9–12.9)
POTASSIUM SERPL-SCNC: 3.9 MMOL/L (ref 3.5–5.1)
RBC # BLD AUTO: 3.52 M/UL (ref 3.8–5.2)
SODIUM SERPL-SCNC: 139 MMOL/L (ref 136–145)
WBC # BLD AUTO: 8.3 K/UL (ref 3.6–11)

## 2024-11-28 PROCEDURE — 6360000002 HC RX W HCPCS: Performed by: SPECIALIST

## 2024-11-28 PROCEDURE — 80048 BASIC METABOLIC PNL TOTAL CA: CPT

## 2024-11-28 PROCEDURE — 85027 COMPLETE CBC AUTOMATED: CPT

## 2024-11-28 PROCEDURE — 6370000000 HC RX 637 (ALT 250 FOR IP): Performed by: SPECIALIST

## 2024-11-28 PROCEDURE — G0378 HOSPITAL OBSERVATION PER HR: HCPCS

## 2024-11-28 PROCEDURE — 36415 COLL VENOUS BLD VENIPUNCTURE: CPT

## 2024-11-28 PROCEDURE — 94761 N-INVAS EAR/PLS OXIMETRY MLT: CPT

## 2024-11-28 PROCEDURE — 96374 THER/PROPH/DIAG INJ IV PUSH: CPT

## 2024-11-28 RX ORDER — DIPHENHYDRAMINE HCL 25 MG
25 CAPSULE ORAL EVERY 6 HOURS PRN
Status: DISCONTINUED | OUTPATIENT
Start: 2024-11-28 | End: 2024-11-28 | Stop reason: HOSPADM

## 2024-11-28 RX ADMIN — ACETAMINOPHEN 1000 MG: 500 TABLET ORAL at 09:10

## 2024-11-28 RX ADMIN — IBUPROFEN 800 MG: 400 TABLET, FILM COATED ORAL at 09:11

## 2024-11-28 RX ADMIN — DOCUSATE SODIUM 100 MG: 100 CAPSULE, LIQUID FILLED ORAL at 09:10

## 2024-11-28 RX ADMIN — KETOROLAC TROMETHAMINE 30 MG: 30 INJECTION, SOLUTION INTRAMUSCULAR at 02:08

## 2024-11-28 RX ADMIN — OXYCODONE 5 MG: 5 TABLET ORAL at 02:24

## 2024-11-28 RX ADMIN — DIPHENHYDRAMINE HYDROCHLORIDE 25 MG: 25 CAPSULE ORAL at 02:23

## 2024-11-28 ASSESSMENT — PAIN SCALES - GENERAL: PAINLEVEL_OUTOF10: 5

## 2024-11-28 ASSESSMENT — PAIN DESCRIPTION - DESCRIPTORS: DESCRIPTORS: ACHING

## 2024-11-28 ASSESSMENT — PAIN DESCRIPTION - LOCATION: LOCATION: ABDOMEN

## 2024-11-28 ASSESSMENT — PAIN DESCRIPTION - ORIENTATION: ORIENTATION: MID

## 2024-11-28 NOTE — PROGRESS NOTES
.Bedside and Verbal shift change report given to ISHAAN Maradiaga (oncoming nurse) by Winifred QUIROS (offgoing nurse). Report included the following information Nurse Handoff Report, Index, Adult Overview, Surgery Report, Intake/Output, MAR, Recent Results, and Med Rec Status.    
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]  Complex cyst of both ovaries [N83.291, N83.292]  Postoperative Diagnosis: * No post-op diagnosis entered *    Procedure: Procedure(s):  ROBOTIC TOTAL LAPAROSCOPIC HYSTERECTOMY, BILATERAL SALPINGECTOMY, BILATERAL OVARIAN CYSTECTOMY  Surgeons and Role:     * Christian Vazquez MD - Primary     * Channing Duenas MD - Assisting  Anesthesia: General   Estimated Blood Loss: min  Specimens:   ID Type Source Tests Collected by Time Destination   1 : CERVIX, UTERUS, BILATERAL FALLOPIAN TUBES Tissue Uterus SURGICAL PATHOLOGY Christian Vazquez MD 11/27/2024 0809       Findings: Small B/L ovarian cyst   Complications: none  Implants: none  
Bedside and Verbal shift change report given to Divine GARCIA RN (oncoming nurse) by Hiren MARTIN RN (offgoing nurse). Report included the following information Nurse Handoff Report, Index, Intake/Output, MAR, and Recent Results.    
I have reviewed the provider's instructions and medications  with the patient, answering all questions to her satisfaction.   
TRANSFER - IN REPORT:    Verbal report received from Susanne QUIROS on Tia Bowden  being received from PACU for routine post-op      Report consisted of patient's Situation, Background, Assessment and   Recommendations(SBAR).     Information from the following report(s) Nurse Handoff Report, Surgery Report, Intake/Output, MAR, and Recent Results was reviewed with the receiving nurse.    Opportunity for questions and clarification was provided.      Assessment completed upon patient's arrival to unit and care assumed.    
PG    MCHC 34.0 30.0 - 36.5 g/dL    RDW 13.1 11.5 - 14.5 %    Platelets 181 150 - 400 K/uL    MPV 11.7 8.9 - 12.9 FL    Nucleated RBCs 0.0 0  WBC    nRBC 0.00 0.00 - 0.01 K/uL       Assesment: S/P TLH WITH B/L SALPINGECTOMIES    Plan: DISCHARGE HOME   FOLLOW UP 2 WEEKS

## (undated) DEVICE — GLOVE SURG SZ 6 L12IN FNGR THK79MIL GRN LTX FREE

## (undated) DEVICE — BANDAGE COMPR W3INXL5YD WHT BGE POLY COT M E WRP WV HK AND

## (undated) DEVICE — SPONGE GZ W4XL4IN COT 12 PLY TYP VII WVN C FLD DSGN STERILE

## (undated) DEVICE — SOLUTION IRRIG 1000ML 0.9% SOD CHL USP POUR PLAS BTL

## (undated) DEVICE — DRAPE SURG EQUIP W105XH13XL20IN 3 ARM DISPOSABLE DA VINCI S

## (undated) DEVICE — SYRINGE IRRIG 60ML SFT PLIABLE BLB EZ TO GRP 1 HND USE W/

## (undated) DEVICE — PADDING CAST 4NW X 4DL SYN PLSTR NON ABSRBNT PRTCH LATEX FRE

## (undated) DEVICE — GOWN,SIRUS,NONRNF,SETINSLV,XL,20/CS: Brand: MEDLINE

## (undated) DEVICE — APPLICATOR MEDICATED 26 CC SOLUTION HI LT ORNG CHLORAPREP

## (undated) DEVICE — Device

## (undated) DEVICE — DRESSING,GAUZE,XEROFORM,CURAD,1"X8",ST: Brand: CURAD

## (undated) DEVICE — PAD,ABDOMINAL,5"X9",ST,LF,25/BX: Brand: MEDLINE INDUSTRIES, INC.

## (undated) DEVICE — DRAPE,HAND,STERILE: Brand: MEDLINE

## (undated) DEVICE — MINOR BASIN -SMH: Brand: MEDLINE INDUSTRIES, INC.

## (undated) DEVICE — ELECTRO LUBE IS A SINGLE PATIENT USE DEVICE THAT IS INTENDED TO BE USED ON ELECTROSURGICAL ELECTRODES TO REDUCE STICKING.: Brand: KEY SURGICAL ELECTRO LUBE

## (undated) DEVICE — VCARE MEDIUM, UTERINE MANIPULATOR, VAGINAL-CERVICAL-AHLUWALIA'S-RETRACTOR-ELEVATOR: Brand: VCARE

## (undated) DEVICE — SUTURE VCRL SZ 0 L27IN ABSRB UD L26MM CT-2 1/2 CIR J270H

## (undated) DEVICE — SURGICEL ENDOSCP APPL

## (undated) DEVICE — DISPOSABLE TOURNIQUET CUFF SINGLE BLADDER, DUAL PORT AND QUICK CONNECT CONNECTOR: Brand: COLOR CUFF

## (undated) DEVICE — AIRSEAL 8 MM ACCESS PORT AND LOW PROFILE OBTURATOR WITH BLADELESS OPTICAL TIP, 120 MM LENGTH: Brand: AIRSEAL

## (undated) DEVICE — BLADELESS OBTURATOR: Brand: WECK VISTA

## (undated) DEVICE — SYRINGE MED 10ML LUERLOCK TIP W/O SFTY DISP

## (undated) DEVICE — TUBING, SUCTION, 1/4" X 10', STRAIGHT: Brand: MEDLINE

## (undated) DEVICE — GARMENT,MEDLINE,DVT,INT,CALF,MED, GEN2: Brand: MEDLINE

## (undated) DEVICE — GOWN,SIRUS,FABRNF,XL,20/CS: Brand: MEDLINE

## (undated) DEVICE — SYRINGE ANGIO 10ML RED FLAT GRP FIX M LUER CONN MEDALLION

## (undated) DEVICE — STERILE POLYISOPRENE POWDER-FREE SURGICAL GLOVES: Brand: PROTEXIS

## (undated) DEVICE — SUTURE ETHLN SZ 4-0 L18IN NONABSORBABLE BLK L19MM FS-2 3/8 662G

## (undated) DEVICE — INTENT OT USE PROVIDES A STERILE INTERFACE BETWEEN THE OPERATING ROOM SURGICAL LAMPS (NON-STERILE) AND THE SURGEON OR STAFF WORKING IN THE STERILE FIELD.: Brand: ASPEN® ALC PLUS LIGHT HANDLE COVER

## (undated) DEVICE — STRAP,POSITIONING,KNEE/BODY,FOAM,4X60": Brand: MEDLINE

## (undated) DEVICE — DRAPE,REIN 53X77,STERILE: Brand: MEDLINE

## (undated) DEVICE — SOLUTION IV 1000ML 0.9% SOD CHL PH 5 INJ USP VIAFLX PLAS

## (undated) DEVICE — SUTURE STRATAFIX SPRL PDS + SZ 2-0 L6IN ABSRB VLT L36MM SXPP1B409

## (undated) DEVICE — TIP COVER ACCESSORY

## (undated) DEVICE — APPLICATOR LAP 45CM FLX 2 VISTASEAL

## (undated) DEVICE — GLOVE ORANGE PI 7 1/2   MSG9075

## (undated) DEVICE — BANDAGE,ELASTIC,ESMARK,STERILE,4"X9',LF: Brand: MEDLINE

## (undated) DEVICE — AGENT HEMSTAT 3GM OXIDIZED REGENERATED CELOS ABSRB FOR CONT (ORDER MULTIPLES OF 5EA)

## (undated) DEVICE — LLETZ LOOP ELECTRODE, 10MM WIDE X 10MM DEEP, 11.8 CM SHAFT, YELLOW: Brand: MEGADYNE

## (undated) DEVICE — PACK,LITHOTOMY,PK I: Brand: MEDLINE

## (undated) DEVICE — ELECTRODE LOOP 12X20MM SHFT L5IN DIA3/32IN LEEP LLETZ

## (undated) DEVICE — SYSTEM EVAC SMOKE LAPARSCOPIC

## (undated) DEVICE — X-RAY DETECTABLE SPONGES,16 PLY: Brand: VISTEC

## (undated) DEVICE — INFECTION CONTROL KIT SYS

## (undated) DEVICE — GYN LAPAROSCOPY - SMH: Brand: MEDLINE INDUSTRIES, INC.

## (undated) DEVICE — SPLINT ORTH W5XL30IN PLSTR OF PARIS LO EXOTHERM SMOOTH

## (undated) DEVICE — PAD PT POS 36 IN SURGYPAD DISP

## (undated) DEVICE — HYPODERMIC SAFETY NEEDLE: Brand: MONOJECT

## (undated) DEVICE — SEALANT TISS 10 ML FIBRIN VISTASEAL

## (undated) DEVICE — GLOVE SURG SZ 75 L12IN FNGR THK79MIL GRN LTX FREE

## (undated) DEVICE — SEAL

## (undated) DEVICE — TRI-LUMEN FILTERED TUBE SET WITH ACTIVATED CHARCOAL FILTER: Brand: AIRSEAL

## (undated) DEVICE — BALL ELECTRODE: Brand: VALLEYLAB

## (undated) DEVICE — CORD ES L12FT BPLR FRCP

## (undated) DEVICE — TOWEL,OR,DSP,ST,BLUE,STD,2/PK,40PK/CS: Brand: MEDLINE

## (undated) DEVICE — REM POLYHESIVE ADULT PATIENT RETURN ELECTRODE: Brand: VALLEYLAB

## (undated) DEVICE — CATHETER,URETHRAL,REDRUBBER,STRL,14FR: Brand: MEDLINE INDUSTRIES, INC.

## (undated) DEVICE — INSUFFLATION NEEDLE TO ESTABLISH PNEUMOPERITONEUM.: Brand: INSUFFLATION NEEDLE

## (undated) DEVICE — BANDAGE COMPR W4INXL5YD WHT BGE POLY COT M E WRP WV HK AND

## (undated) DEVICE — PADDING CAST W4INXL4YD ST COT RAYON MICROPLEATED HIGHLY

## (undated) DEVICE — SUTURE ETHLN SZ 4-0 L18IN NONABSORBABLE BLK L19MM PS-2 3/8 1667H

## (undated) DEVICE — PAD BD MATTRESS 73X32 IN STD CONVOLUTED FOAM LTX FREE

## (undated) DEVICE — SYRINGE,EAR/ULCER, 2 OZ, STERILE: Brand: MEDLINE

## (undated) DEVICE — YANKAUER,BULB TIP,W/O VENT,RIGID,STERILE: Brand: MEDLINE

## (undated) DEVICE — SOLUTION SCRB 2OZ 10% POVIDONE IOD ANTIMIC BTL

## (undated) DEVICE — SUTURE MONOCRYL + SZ 4-0 L27IN ABSRB UD L19MM PS-2 3/8 CIR MCP426H